# Patient Record
Sex: FEMALE | Race: WHITE
[De-identification: names, ages, dates, MRNs, and addresses within clinical notes are randomized per-mention and may not be internally consistent; named-entity substitution may affect disease eponyms.]

---

## 2017-01-24 ENCOUNTER — HOSPITAL ENCOUNTER (EMERGENCY)
Dept: HOSPITAL 25 - UCEAST | Age: 32
Discharge: HOME | End: 2017-01-24
Payer: COMMERCIAL

## 2017-01-24 VITALS — DIASTOLIC BLOOD PRESSURE: 91 MMHG | SYSTOLIC BLOOD PRESSURE: 139 MMHG

## 2017-01-24 DIAGNOSIS — Z32.02: ICD-10-CM

## 2017-01-24 DIAGNOSIS — F17.210: ICD-10-CM

## 2017-01-24 DIAGNOSIS — R00.2: Primary | ICD-10-CM

## 2017-01-24 DIAGNOSIS — R19.7: ICD-10-CM

## 2017-01-24 DIAGNOSIS — Z88.0: ICD-10-CM

## 2017-01-24 PROCEDURE — 84443 ASSAY THYROID STIM HORMONE: CPT

## 2017-01-24 PROCEDURE — G0463 HOSPITAL OUTPT CLINIC VISIT: HCPCS

## 2017-01-24 PROCEDURE — 81025 URINE PREGNANCY TEST: CPT

## 2017-01-24 PROCEDURE — 85025 COMPLETE CBC W/AUTO DIFF WBC: CPT

## 2017-01-24 PROCEDURE — 80048 BASIC METABOLIC PNL TOTAL CA: CPT

## 2017-01-24 PROCEDURE — 36415 COLL VENOUS BLD VENIPUNCTURE: CPT

## 2017-01-24 PROCEDURE — 93005 ELECTROCARDIOGRAM TRACING: CPT

## 2017-01-24 PROCEDURE — 99211 OFF/OP EST MAY X REQ PHY/QHP: CPT

## 2017-01-24 NOTE — UC
Palpitation/Dysrhythmia HP





- HPI Summary


HPI Summary: 





32 yo female has had intermittent skipped beats since 3AM


no CP or SOB





dad has AF





no f/c





three days ago had 15-20 episodes of diarrhea











- History of Current Complaint


Chief Complaint: UCChestPain


Stated Complaint: CHEST PAIN, AND PALIPATATIONS


Time Seen by Provider: 01/24/17 17:32


Hx Obtained From: Patient


Hx Last Menstrual Period: 3-4 weeks ago


Onset/Duration: Sudden Onset - about 3AM


Timing: Intermittent Episodes Lasting: - seconds


Severity Initially: Mild


Severity Currently: None


Pain Intensity: 1


Pain Scale Used: 0-10 Numeric


Character: Skipped Beats


Aggravating Factor(s): Nothing


Alleviating Factor(s): Nothing


Related History: Similar Episode/Dx as - has had them in past





- Allergy/Home Medications


Allergies/Adverse Reactions: 


 Allergies











Allergy/AdvReac Type Severity Reaction Status Date / Time


 


Penicillins [PCN] Allergy  Hives Verified 01/24/17 17:18











Home Medications: 


 Home Medications





NK [No Home Medications Reported]  01/24/17 [History Confirmed 01/24/17]











PMH/Surg Hx/FS Hx/Imm Hx


Previously Healthy: Yes





- Surgical History


Surgical History: None





- Family History


Known Family History: Positive: Hypertension, Diabetes, Other - a/f





- Social History


Alcohol Use: Occasionally


Substance Use Type: None


Smoking Status (MU): Current Every Day Smoker


Length of Time of Smoking/Using Tobacco: 4 cig a day





Review of Systems


Constitutional: Negative


Skin: Negative


Eyes: Negative


ENT: Negative


Respiratory: Negative


Cardiovascular: Palpitations


Gastrointestinal: Negative


Genitourinary: Negative


Motor: Negative


Neurovascular: Negative


Musculoskeletal: Negative


Neurological: Negative


Psychological: Negative


All Other Systems Reviewed And Are Negative: Yes





Physical Exam


Triage Information Reviewed: Yes


Appearance: Well-Appearing, No Pain Distress, Well-Nourished, Obese


Vital Signs: 


 Initial Vital Signs











Temp  97.8 F   01/24/17 17:06


 


Pulse  70   01/24/17 17:06


 


Resp  16   01/24/17 17:06


 


BP  139/91   01/24/17 17:06


 


Pulse Ox  99   01/24/17 17:06











Vital Signs Reviewed: Yes


Eyes: Positive: Conjunctiva Clear


ENT: Positive: Hearing grossly normal, Pharynx normal, Tonsillar swelling.  

Negative: Nasal congestion, Nasal drainage, Tonsillar exudate, Trismus, Muffled/

hoarse voice


Neck exam: Normal


Neck: Positive: Supple, Nontender, No Lymphadenopathy


Respiratory: Positive: Lungs clear, Normal breath sounds, No respiratory 

distress


Cardiovascular: Positive: RRR, No Murmur.  Negative: Tachycardia


Abdomen Description: Positive: Nontender, No Organomegaly, Soft


Musculoskeletal: Positive: ROM Intact, No Edema


Neurological Exam: Normal


Neurological: Positive: Alert


Psychological Exam: Normal





Diagnostics





- EKG


Cardiac Rate: NL


Cardiac Rhythm: Sinus: Normal


Ectopy: None


ST Segment: Normal





Palpitations Course/Dx





- Differential Dx/Diagnosis


Provider Diagnoses: palpitations





Discharge





- Discharge Plan


Condition: Stable


Disposition: HOME


Patient Education Materials:  Palpitations (ED)


Referrals: 


Vesta Cruz [Primary Care Provider] - 1 Week


Additional Instructions: 


blood work is pending





decrease or stop smoking





I suggest you ask your MD about getting a SLEEP STUDY





return for new or worsening symptoms





decrease or stop smoking


avoid caffeine

## 2017-01-25 LAB
ANION GAP SERPL CALC-SCNC: 6 MMOL/L (ref 2–11)
BUN SERPL-MCNC: 9 MG/DL (ref 6–24)
BUN/CREAT SERPL: 14.8 (ref 8–20)
CALCIUM SERPL-MCNC: 9.3 MG/DL (ref 8.6–10.3)
CHLORIDE SERPL-SCNC: 102 MMOL/L (ref 101–111)
GLUCOSE SERPL-MCNC: 76 MG/DL (ref 70–100)
HCO3 SERPL-SCNC: 28 MMOL/L (ref 22–32)
HCT VFR BLD AUTO: 42 % (ref 35–47)
HGB BLD-MCNC: 14.2 G/DL (ref 12–16)
MCH RBC QN AUTO: 29 PG (ref 27–31)
MCHC RBC AUTO-ENTMCNC: 34 G/DL (ref 31–36)
MCV RBC AUTO: 86 FL (ref 80–97)
POTASSIUM SERPL-SCNC: 3.7 MMOL/L (ref 3.5–5)
RBC # BLD AUTO: 4.91 10^6/UL (ref 4–5.4)
SODIUM SERPL-SCNC: 136 MMOL/L (ref 133–145)
TSH SERPL-ACNC: 3.23 MCIU/ML (ref 0.34–5.6)
WBC # BLD AUTO: 10.8 10^3/UL (ref 3.5–10.8)

## 2017-09-11 ENCOUNTER — HOSPITAL ENCOUNTER (EMERGENCY)
Dept: HOSPITAL 25 - UCEAST | Age: 32
Discharge: HOME | End: 2017-09-11
Payer: COMMERCIAL

## 2017-09-11 VITALS — SYSTOLIC BLOOD PRESSURE: 128 MMHG | DIASTOLIC BLOOD PRESSURE: 78 MMHG

## 2017-09-11 DIAGNOSIS — F17.210: ICD-10-CM

## 2017-09-11 DIAGNOSIS — J03.90: Primary | ICD-10-CM

## 2017-09-11 PROCEDURE — G0463 HOSPITAL OUTPT CLINIC VISIT: HCPCS

## 2017-09-11 PROCEDURE — 99212 OFFICE O/P EST SF 10 MIN: CPT

## 2017-09-11 NOTE — UC
Throat Pain/Nasal Oz HPI





- HPI Summary


HPI Summary: 





32 yo female with about a day hx of sore throat/fever/chills/HA and myalgias





works at a school





had a rash with amox as a child





- History of Current Complaint


Chief Complaint: UCGeneralIllness


Stated Complaint: SORE THROAT


Time Seen by Provider: 09/11/17 09:49


Hx Obtained From: Patient


Hx Last Menstrual Period: 9/14/17


Onset/Duration: Gradual Onset, Lasting Hours


Severity: Moderate


Pain Intensity: 4


Pain Scale Used: 0-10 Numeric


Cough: None


Associated Signs & Symptoms: Positive: Fever





- Allergies/Home Medications


Allergies/Adverse Reactions: 


 Allergies











Allergy/AdvReac Type Severity Reaction Status Date / Time


 


Penicillins [PCN] Allergy  Hives Verified 09/11/17 09:43











Home Medications: 


 Home Medications





LORazepam TAB(*) [Ativan 0.5 MG TAB (*)] 0.5 mg PO Q6HR 09/11/17 [History 

Confirmed 09/11/17]











PMH/Surg Hx/FS Hx/Imm Hx


Previously Healthy: Yes





- Surgical History


Surgical History: None





- Family History


Known Family History: Positive: Cardiac Disease, Hypertension, Diabetes, Other 

- a/f





- Social History


Alcohol Use: Occasionally


Substance Use Type: None


Smoking Status (MU): Current Every Day Smoker


Length of Time of Smoking/Using Tobacco: 4 cig a day





Review of Systems


Constitutional: Fever, Chills


Skin: Negative


Eyes: Negative


ENT: Sore Throat


Respiratory: Negative


Cardiovascular: Negative


Gastrointestinal: Negative


Genitourinary: Negative


Motor: Negative


Neurovascular: Negative


Musculoskeletal: Myalgia


Neurological: Headache


Psychological: Negative


Is Patient Immunocompromised?: No


All Other Systems Reviewed And Are Negative: Yes





Physical Exam


Triage Information Reviewed: Yes


Appearance: Well-Appearing, No Pain Distress, Well-Nourished


Vital Signs: 


 Initial Vital Signs











Temp  98.3 F   09/11/17 09:45


 


Pulse  70   09/11/17 09:45


 


Resp  16   09/11/17 09:45


 


BP  128/78   09/11/17 09:45


 


Pulse Ox  99   09/11/17 09:45











Vital Signs Reviewed: Yes


Eyes: Positive: Conjunctiva Clear


ENT: Positive: Hearing grossly normal, Pharyngeal erythema, TMs normal, 

Tonsillar swelling, Tonsillar exudate.  Negative: Nasal congestion, Nasal 

drainage, Trismus, Muffled/hoarse voice


Dental Exam: Normal


Neck: Positive: Supple, Enlarged Nodes @ - ant cervical


Respiratory: Positive: Lungs clear, Normal breath sounds, No respiratory 

distress


Cardiovascular: Positive: RRR, No Murmur


Musculoskeletal: Positive: ROM Intact, No Edema


Neurological: Positive: Alert, Muscle Tone Normal


Psychological Exam: Normal


Skin Exam: Normal





Throat Pain/Nasal Course/Dx





- Differential Dx/Diagnosis


Provider Diagnoses: acute exudative tonsillitis





Discharge





- Discharge Plan


Condition: Stable


Disposition: HOME


Prescriptions: 


Cephalexin CAP* [Keflex CAP*] 500 mg PO BID #20 cap


Fluconazole 150 MG (NF) [Diflucan 150 mg (NF)] 150 mg PO ONCE #1 tab


Patient Education Materials:  Tonsillitis (ED)


Forms:  *Work Release


Referrals: 


Vesta Cruz [Primary Care Provider] - 4 Days (if not better)


Additional Instructions: 


recheck in 3-4 days if not better





rest


fluids


tylenol

## 2017-11-25 ENCOUNTER — HOSPITAL ENCOUNTER (EMERGENCY)
Dept: HOSPITAL 25 - ED | Age: 32
Discharge: HOME | End: 2017-11-25
Payer: COMMERCIAL

## 2017-11-25 ENCOUNTER — HOSPITAL ENCOUNTER (EMERGENCY)
Dept: HOSPITAL 25 - UCEAST | Age: 32
Discharge: ANOTHER HOSPITAL | End: 2017-11-25
Payer: COMMERCIAL

## 2017-11-25 VITALS — DIASTOLIC BLOOD PRESSURE: 81 MMHG | SYSTOLIC BLOOD PRESSURE: 128 MMHG

## 2017-11-25 VITALS — DIASTOLIC BLOOD PRESSURE: 79 MMHG | SYSTOLIC BLOOD PRESSURE: 132 MMHG

## 2017-11-25 DIAGNOSIS — Z86.718: ICD-10-CM

## 2017-11-25 DIAGNOSIS — Z88.0: ICD-10-CM

## 2017-11-25 DIAGNOSIS — Z72.0: ICD-10-CM

## 2017-11-25 DIAGNOSIS — F17.210: ICD-10-CM

## 2017-11-25 DIAGNOSIS — Z86.711: ICD-10-CM

## 2017-11-25 DIAGNOSIS — R20.8: ICD-10-CM

## 2017-11-25 DIAGNOSIS — I83.93: Primary | ICD-10-CM

## 2017-11-25 DIAGNOSIS — M79.89: Primary | ICD-10-CM

## 2017-11-25 DIAGNOSIS — M79.661: ICD-10-CM

## 2017-11-25 DIAGNOSIS — M79.662: ICD-10-CM

## 2017-11-25 LAB
ALBUMIN SERPL BCG-MCNC: 4.1 G/DL (ref 3.2–5.2)
ALP SERPL-CCNC: 52 U/L (ref 34–104)
ALT SERPL W P-5'-P-CCNC: 23 U/L (ref 7–52)
ANION GAP SERPL CALC-SCNC: 6 MMOL/L (ref 2–11)
AST SERPL-CCNC: 19 U/L (ref 13–39)
BUN SERPL-MCNC: 10 MG/DL (ref 6–24)
BUN/CREAT SERPL: 15.4 (ref 8–20)
CALCIUM SERPL-MCNC: 9 MG/DL (ref 8.6–10.3)
CHLORIDE SERPL-SCNC: 105 MMOL/L (ref 101–111)
GLOBULIN SER CALC-MCNC: 3 G/DL (ref 2–4)
GLUCOSE SERPL-MCNC: 70 MG/DL (ref 70–100)
HCO3 SERPL-SCNC: 28 MMOL/L (ref 22–32)
HCT VFR BLD AUTO: 41 % (ref 35–47)
HGB BLD-MCNC: 13.9 G/DL (ref 12–16)
MCH RBC QN AUTO: 30 PG (ref 27–31)
MCHC RBC AUTO-ENTMCNC: 34 G/DL (ref 31–36)
MCV RBC AUTO: 86 FL (ref 80–97)
POTASSIUM SERPL-SCNC: 3.3 MMOL/L (ref 3.5–5)
PROT SERPL-MCNC: 7.1 G/DL (ref 6.4–8.9)
RBC # BLD AUTO: 4.72 10^6/UL (ref 4–5.4)
SODIUM SERPL-SCNC: 139 MMOL/L (ref 133–145)
WBC # BLD AUTO: 10 10^3/UL (ref 3.5–10.8)

## 2017-11-25 PROCEDURE — 99211 OFF/OP EST MAY X REQ PHY/QHP: CPT

## 2017-11-25 PROCEDURE — 80053 COMPREHEN METABOLIC PANEL: CPT

## 2017-11-25 PROCEDURE — G0463 HOSPITAL OUTPT CLINIC VISIT: HCPCS

## 2017-11-25 PROCEDURE — 85730 THROMBOPLASTIN TIME PARTIAL: CPT

## 2017-11-25 PROCEDURE — 36415 COLL VENOUS BLD VENIPUNCTURE: CPT

## 2017-11-25 PROCEDURE — 85027 COMPLETE CBC AUTOMATED: CPT

## 2017-11-25 PROCEDURE — 85610 PROTHROMBIN TIME: CPT

## 2017-11-25 PROCEDURE — 86141 C-REACTIVE PROTEIN HS: CPT

## 2017-11-25 PROCEDURE — 99282 EMERGENCY DEPT VISIT SF MDM: CPT

## 2017-11-25 NOTE — RAD
INDICATION:  Right lower extremity pain and swelling.



COMPARISON:  There are no prior studies available for comparison.



TECHNIQUE:  Multiple real-time, color flow and Doppler tracings of the right lower

extremity were obtained.



FINDINGS: The common femoral, femoral, profunda femoral and popliteal veins all

demonstrate normal compressibility, augmentation with compression and phasic response with

respiration.



The posterior tibial and peroneal veins demonstrate normal compressibility and

augmentation with compression.



IMPRESSION:  NO EVIDENCE FOR DEEP VENOUS THROMBOSIS.

## 2017-11-25 NOTE — UC
Lower Extremity/Ankle HPI





- HPI Summary


HPI Summary: 


unusual pain and swelling sensation in right lower calf, bruising with hematoma 

right thigh








- History of Current Complaint


Chief Complaint: UCLowerExtremity


Stated Complaint: SPOTS ON LEGS


Time Seen by Provider: 11/25/17 14:03


Hx Obtained From: Patient


Hx From Patient Unobtainable Due To: Dementia


Hx Last Menstrual Period: 11/17/17


Pregnant?: No


Onset/Duration: Sudden Onset, Lasting Days - 2, Still Present


Severity Initially: Mild


Severity Currently: Mild


Aggravating Factor(s): Nothing


Alleviating Factor(s): Nothing


Able to Bear Weight: Yes





- Allergies/Home Medications


Allergies/Adverse Reactions: 


 Allergies











Allergy/AdvReac Type Severity Reaction Status Date / Time


 


Fluoxetine [From Prozac] Allergy  See Comment Verified 11/25/17 14:40


 


Penicillins [PCN] Allergy  Hives Verified 11/25/17 14:40














PMH/Surg Hx/FS Hx/Imm Hx


Previously Healthy: No - dlood clots in right leg times 2





- Surgical History


Surgical History: None





- Family History


Known Family History: Positive: Cardiac Disease, Hypertension, Diabetes, Other 

- a/f





- Social History


Occupation: Employed Full-time


Lives: Alone


Alcohol Use: Occasionally


Substance Use Type: None


Smoking Status (MU): Current Every Day Smoker


Length of Time of Smoking/Using Tobacco: 4 cig a day


Cessation Counseling: Patient Advised to Stop





Review of Systems


Constitutional: Negative


Skin: Negative


Eyes: Negative


ENT: Negative


Respiratory: Negative


Cardiovascular: Negative


Gastrointestinal: Negative


Genitourinary: Negative


Motor: Negative


Neurovascular: Negative


Musculoskeletal: Myalgia - right thight and calf


Neurological: Negative


Psychological: Negative


Is Patient Immunocompromised?: No


All Other Systems Reviewed And Are Negative: Yes





Physical Exam


Triage Information Reviewed: Yes


Appearance: Well-Appearing, No Pain Distress, Well-Nourished


Vital Signs: 


 Initial Vital Signs











Temp  97.8 F   11/25/17 11:45


 


Pulse  90   11/25/17 11:45


 


Resp  18   11/25/17 11:45


 


BP  131/66   11/25/17 11:45


 


Pulse Ox  99   11/25/17 11:45











Vital Signs Reviewed: Yes


Eye Exam: Normal


Eyes: Positive: Conjunctiva Clear


ENT Exam: Normal


ENT: Positive: Normal ENT inspection, Hearing grossly normal, Pharynx normal.  

Negative: Nasal congestion, Nasal drainage, Tonsillar swelling, Tonsillar 

exudate, Trismus, Muffled voice, Hoarse voice


Dental Exam: Normal


Neck exam: Normal


Neck: Positive: Supple, Nontender


Respiratory Exam: Normal


Respiratory: Positive: Chest non-tender, Lungs clear, Normal breath sounds, No 

respiratory distress, No accessory muscle use


Cardiovascular Exam: Normal


Cardiovascular: Positive: RRR, No Murmur, Pulses Normal, Brisk Capillary Refill


Musculoskeletal Exam: Normal


Musculoskeletal: Positive: Strength Intact, ROM Intact


Neurological Exam: Normal


Psychological Exam: Normal


Skin Exam: Normal





Lower Extremity Course/Dx





- Course


Course Of Treatment: [place] [facility] [place]





- Differential Dx/Diagnosis


Provider Diagnoses: Swelling 4 leg





Discharge





- Discharge Plan


Condition: Stable


Disposition: OTHER


Discharge Disposition Comment: [facility]


Patient Education Materials:  Leg Edema (ED)


Referrals: 


Abby Flowers MD [Primary Care Provider] - 


Additional Instructions: 


Please report to emergency department for further evaluation on swelling and 

tightness in right lower leg

## 2017-11-25 NOTE — ED
Bora MOHAMUD Gabriel, scribed for Noel Reyes MD on 11/25/17 at 1738 .





Lower Extremity





- HPI Summary


HPI Summary: 





This patient is a 32 year old F presenting to [facility] with a chief complaint of 

pain in LLE since 3 days prior. She was sent to the ED after being referred 

from urgent care for possible DVTs due to her history of them. The patient 

rates the pain 3/10 in severity. Patient reports unexplained ecchymosis in legs

, numbness and tingling in her right hand, and pain in her right calf. Patient 

denies CP.





- History of Current Complaint


Chief Complaint: EDGeneral


Stated Complaint: LEG AND ARM PAIN SENT FROM [facility]


Time Seen by Provider: 11/25/17 17:10


Hx Obtained From: Patient


Hx Last Menstrual Period: 11/17/17


Onset/Duration: Still Present - 3


Pain Intensity: 3


Pain Scale Used: 0-10 Numeric


Timing: Constant


Able to Bear Weight: Yes





- Allergies/Home Medications


Allergies/Adverse Reactions: 


 Allergies











Allergy/AdvReac Type Severity Reaction Status Date / Time


 


Fluoxetine [From Prozac] Allergy  See Comment Verified 11/25/17 14:40


 


Penicillins [PCN] Allergy  Hives Verified 11/25/17 14:40














PMH/Surg Hx/FS Hx/Imm Hx


Previously Healthy: No


Endocrine/Hematology History: 


   Denies: Hx Diabetes, Hx Thyroid Disease


Cardiovascular History: Reports: Hx Deep Vein Thrombosis - in 2016 


   Denies: Hx Hypertension


Respiratory History: 


   Denies: Hx Asthma, Hx Chronic Obstructive Pulmonary Disease (COPD)


GI History: 


   Denies: Hx Ulcer





- Immunization History


Date of Tetanus Vaccine: UTD


Date of Influenza Vaccine: NO


Infectious Disease History: No


Infectious Disease History: 


   Denies: Hx Clostridium Difficile, Hx Hepatitis, Hx Human Immunodeficiency 

Virus (HIV), Hx of Known/Suspected MRSA, Hx Shingles, Hx Tuberculosis, Hx Known/

Suspected VRE, Hx Known/Suspected VRSA, History Other Infectious Disease, 

Traveled Outside the [place] in Last 30 Days





- Family History


Known Family History: Positive: Cardiac Disease, Hypertension, Diabetes, Other 

- a/f





- Social History


Alcohol Use: Occasionally


Substance Use Type: Reports: None


Smoking Status (MU): Light Every Day Tobacco Smoker


Length of Time of Smoking/Using Tobacco: 4 cig a day





Review of Systems


Negative: Fever, Chills


Negative: Erythema


Negative: Sore Throat


Negative: Chest Pain


Negative: Shortness Of Breath, Cough


Negative: Abdominal Pain, Vomiting, Nausea


Negative: dysuria, hematuria


Positive: Other - LE pain and numbess and tingling in her right hand .  Negative

: Myalgia


Positive: Bruising - on both LE .  Negative: Rash


Neurological: Negative - dizziness


All Other Systems Reviewed And Are Negative: Yes





Physical Exam





- Summary


Physical Exam Summary: 





Constitutional: Well-developed, Well-nourished, Alert. (-) Distressed


Skin: Warm, Dry. There is induration 1cm below the right groin crease with 

overlaying ecchymosis. 


HENT: Normocephalic; Atraumatic 


Eyes: Conjunctiva normal


Neck: Musculoskeletal ROM normal neck. (-) JVD, (-) Stridor, (-) Tracheal 

deviation


Cardio: Rhythm regular, rate normal, Heart sounds normal; Intact distal pulses; 

The pedal pulses are 2+ and symmetric. Radial pulses are 2+ and symmetric. (-) 

Murmur


Pulmonary/Chest wall: Effort normal. (-) Respiratory distress, (-) Wheezes, (-) 

Rales


Abd: Soft, (-) Tenderness, (-) Distension, (-) Guarding, (-) Rebound


Musculoskeletal: (-) Edema. There is tenderness in the patients right calf. 


Lymph: (-) Cervical adenopathy


Neuro: Alert, Oriented x3


Psych: Mood and affect Normal


Triage Information Reviewed: Yes


Vital Signs On Initial Exam: 


 Initial Vitals











Temp Pulse Resp BP Pulse Ox


 


 97.7 F   66   16   141/81   96 


 


 11/25/17 14:40  11/25/17 14:40  11/25/17 14:40  11/25/17 14:40  11/25/17 14:40











Vital Signs Reviewed: Yes





- [place] Coma Scale


Coma Scale Total: 15





Diagnostics





- Vital Signs


 Vital Signs











  Temp Pulse Resp BP Pulse Ox


 


 11/25/17 14:40  97.7 F  66  16  141/81  96














- Laboratory


Result Diagrams: 


 11/25/17 17:55





 11/25/17 17:55


Lab Statement: Any lab studies that have been ordered have been reviewed, and 

results considered in the medical decision making process.





- Additional Comments


Diagnostic Additional Comments: 





Venous Doppler study reveals, per radiologist, NO EVIDENCE FOR DEEP VENOUS 

THROMBOSIS.


ED physician has reviewed this radiology report and agrees.





Re-Evaluation





- Re-Evaluation


  ** First Eval


Re-Evaluation Time: 18:34


Change: Unchanged - Discussed the possibility of error with patient and told 

her to come back for worsening symptoms.





Lower Extremity Course/Dx





- Diagnoses


Provider Diagnoses: 


 Varicose veins of bilateral lower extremities with pain, Calf pain








Discharge





- Discharge Plan


Condition: Stable


Disposition: HOME


Patient Education Materials:  Varicose Veins (ED)


Referrals: 


Abby Flowers MD [Primary Care Provider] - 3 Days


Additional Instructions: 


Return to the emergency department for new or worsening symptoms. 





The documentation as recorded by the Bora valdez Gabriel accurately reflects 

the service I personally performed and the decisions made by Amy reyes Jerry, MD.

## 2019-03-21 ENCOUNTER — HOSPITAL ENCOUNTER (OUTPATIENT)
Dept: HOSPITAL 25 - ED | Age: 34
Setting detail: OBSERVATION
LOS: 2 days | Discharge: HOME | End: 2019-03-23
Attending: INTERNAL MEDICINE | Admitting: INTERNAL MEDICINE
Payer: COMMERCIAL

## 2019-03-21 DIAGNOSIS — M25.50: ICD-10-CM

## 2019-03-21 DIAGNOSIS — F32.9: ICD-10-CM

## 2019-03-21 DIAGNOSIS — R06.00: ICD-10-CM

## 2019-03-21 DIAGNOSIS — I82.4Z1: ICD-10-CM

## 2019-03-21 DIAGNOSIS — F17.210: ICD-10-CM

## 2019-03-21 DIAGNOSIS — Z88.0: ICD-10-CM

## 2019-03-21 DIAGNOSIS — N76.0: ICD-10-CM

## 2019-03-21 DIAGNOSIS — R21: Primary | ICD-10-CM

## 2019-03-21 DIAGNOSIS — R59.1: ICD-10-CM

## 2019-03-21 DIAGNOSIS — R00.0: ICD-10-CM

## 2019-03-21 DIAGNOSIS — R06.02: ICD-10-CM

## 2019-03-21 LAB
ALBUMIN SERPL BCG-MCNC: 3.4 G/DL (ref 3.2–5.2)
ALBUMIN/GLOB SERPL: 1.1 {RATIO} (ref 1–3)
ALP SERPL-CCNC: 54 U/L (ref 34–104)
ALT SERPL W P-5'-P-CCNC: 12 U/L (ref 7–52)
ANION GAP SERPL CALC-SCNC: 9 MMOL/L (ref 2–11)
AST SERPL-CCNC: 14 U/L (ref 13–39)
BASOPHILS # BLD AUTO: 0 10^3/UL (ref 0–0.2)
BUN SERPL-MCNC: 12 MG/DL (ref 6–24)
BUN/CREAT SERPL: 15.6 (ref 8–20)
CALCIUM SERPL-MCNC: 9 MG/DL (ref 8.6–10.3)
CHLORIDE SERPL-SCNC: 104 MMOL/L (ref 101–111)
CK SERPL-CCNC: 37 U/L (ref 10–223)
EOSINOPHIL # BLD AUTO: 0.5 10^3/UL (ref 0–0.6)
FLUAV RNA SPEC QL NAA+PROBE: NEGATIVE
FLUBV RNA SPEC QL NAA+PROBE: NEGATIVE
GLOBULIN SER CALC-MCNC: 3.1 G/DL (ref 2–4)
GLUCOSE SERPL-MCNC: 100 MG/DL (ref 70–100)
HCG SERPL QL: < 0.6 MIU/ML
HCO3 SERPL-SCNC: 25 MMOL/L (ref 22–32)
HCT VFR BLD AUTO: 46 % (ref 33–41)
HGB BLD-MCNC: 15.7 G/DL (ref 12–16)
INR PPP/BLD: 1.29 (ref 0.77–1.02)
LYMPHOCYTES # BLD AUTO: 1.4 10^3/UL (ref 1–4.8)
MCH RBC QN AUTO: 29 PG (ref 27–31)
MCHC RBC AUTO-ENTMCNC: 34 G/DL (ref 31–36)
MCV RBC AUTO: 85 FL (ref 80–97)
MONOCYTES # BLD AUTO: 0.4 10^3/UL (ref 0–0.8)
NEUTROPHILS # BLD AUTO: 11.8 10^3/UL (ref 1.5–7.7)
NRBC # BLD AUTO: 0 10^3/UL
NRBC BLD QL AUTO: 0
PLATELET # BLD AUTO: 221 10^3/UL (ref 150–450)
POTASSIUM SERPL-SCNC: 3.8 MMOL/L (ref 3.5–5)
PROT SERPL-MCNC: 6.5 G/DL (ref 6.4–8.9)
RBC # BLD AUTO: 5.41 10^6 /UL (ref 3.7–4.87)
RBC UR QL AUTO: (no result)
SODIUM SERPL-SCNC: 138 MMOL/L (ref 135–145)
TSH SERPL-ACNC: 2.05 MCIU/ML (ref 0.34–5.6)
WBC # BLD AUTO: 14.1 10^3/UL (ref 3.5–10.8)
WBC UR QL AUTO: (no result)

## 2019-03-21 PROCEDURE — 82550 ASSAY OF CK (CPK): CPT

## 2019-03-21 PROCEDURE — 87086 URINE CULTURE/COLONY COUNT: CPT

## 2019-03-21 PROCEDURE — G0378 HOSPITAL OBSERVATION PER HR: HCPCS

## 2019-03-21 PROCEDURE — 83605 ASSAY OF LACTIC ACID: CPT

## 2019-03-21 PROCEDURE — 99285 EMERGENCY DEPT VISIT HI MDM: CPT

## 2019-03-21 PROCEDURE — 71046 X-RAY EXAM CHEST 2 VIEWS: CPT

## 2019-03-21 PROCEDURE — 86147 CARDIOLIPIN ANTIBODY EA IG: CPT

## 2019-03-21 PROCEDURE — 82595 ASSAY OF CRYOGLOBULIN: CPT

## 2019-03-21 PROCEDURE — 80053 COMPREHEN METABOLIC PANEL: CPT

## 2019-03-21 PROCEDURE — 87040 BLOOD CULTURE FOR BACTERIA: CPT

## 2019-03-21 PROCEDURE — 84702 CHORIONIC GONADOTROPIN TEST: CPT

## 2019-03-21 PROCEDURE — 82784 ASSAY IGA/IGD/IGG/IGM EACH: CPT

## 2019-03-21 PROCEDURE — 96365 THER/PROPH/DIAG IV INF INIT: CPT

## 2019-03-21 PROCEDURE — 86644 CMV ANTIBODY: CPT

## 2019-03-21 PROCEDURE — 85025 COMPLETE CBC W/AUTO DIFF WBC: CPT

## 2019-03-21 PROCEDURE — 87651 STREP A DNA AMP PROBE: CPT

## 2019-03-21 PROCEDURE — 86308 HETEROPHILE ANTIBODY SCREEN: CPT

## 2019-03-21 PROCEDURE — 80048 BASIC METABOLIC PNL TOTAL CA: CPT

## 2019-03-21 PROCEDURE — C8929 TTE W OR WO FOL WCON,DOPPLER: HCPCS

## 2019-03-21 PROCEDURE — 85610 PROTHROMBIN TIME: CPT

## 2019-03-21 PROCEDURE — 87510 GARDNER VAG DNA DIR PROBE: CPT

## 2019-03-21 PROCEDURE — 87491 CHLMYD TRACH DNA AMP PROBE: CPT

## 2019-03-21 PROCEDURE — 84443 ASSAY THYROID STIM HORMONE: CPT

## 2019-03-21 PROCEDURE — 86665 EPSTEIN-BARR CAPSID VCA: CPT

## 2019-03-21 PROCEDURE — 80307 DRUG TEST PRSMV CHEM ANLYZR: CPT

## 2019-03-21 PROCEDURE — 87480 CANDIDA DNA DIR PROBE: CPT

## 2019-03-21 PROCEDURE — 36415 COLL VENOUS BLD VENIPUNCTURE: CPT

## 2019-03-21 PROCEDURE — 87591 N.GONORRHOEAE DNA AMP PROB: CPT

## 2019-03-21 PROCEDURE — 96361 HYDRATE IV INFUSION ADD-ON: CPT

## 2019-03-21 PROCEDURE — 85384 FIBRINOGEN ACTIVITY: CPT

## 2019-03-21 PROCEDURE — 86664 EPSTEIN-BARR NUCLEAR ANTIGEN: CPT

## 2019-03-21 PROCEDURE — 81015 MICROSCOPIC EXAM OF URINE: CPT

## 2019-03-21 PROCEDURE — 86645 CMV ANTIBODY IGM: CPT

## 2019-03-21 PROCEDURE — 71275 CT ANGIOGRAPHY CHEST: CPT

## 2019-03-21 PROCEDURE — 85652 RBC SED RATE AUTOMATED: CPT

## 2019-03-21 PROCEDURE — 82585 ASSAY OF CRYOFIBRINOGEN: CPT

## 2019-03-21 PROCEDURE — 85379 FIBRIN DEGRADATION QUANT: CPT

## 2019-03-21 PROCEDURE — 93306 TTE W/DOPPLER COMPLETE: CPT

## 2019-03-21 PROCEDURE — 86160 COMPLEMENT ANTIGEN: CPT

## 2019-03-21 PROCEDURE — 86747 PARVOVIRUS ANTIBODY: CPT

## 2019-03-21 PROCEDURE — 83516 IMMUNOASSAY NONANTIBODY: CPT

## 2019-03-21 PROCEDURE — 86592 SYPHILIS TEST NON-TREP QUAL: CPT

## 2019-03-21 PROCEDURE — 96366 THER/PROPH/DIAG IV INF ADDON: CPT

## 2019-03-21 PROCEDURE — 86140 C-REACTIVE PROTEIN: CPT

## 2019-03-21 PROCEDURE — 93005 ELECTROCARDIOGRAM TRACING: CPT

## 2019-03-21 PROCEDURE — 86038 ANTINUCLEAR ANTIBODIES: CPT

## 2019-03-21 PROCEDURE — 81003 URINALYSIS AUTO W/O SCOPE: CPT

## 2019-03-21 RX ADMIN — SODIUM CHLORIDE ONE MLS/HR: 900 IRRIGANT IRRIGATION at 15:07

## 2019-03-21 RX ADMIN — SODIUM CHLORIDE ONE MLS/HR: 900 IRRIGANT IRRIGATION at 16:24

## 2019-03-21 RX ADMIN — LURASIDONE HYDROCHLORIDE SCH MG: 60 TABLET, FILM COATED ORAL at 21:23

## 2019-03-21 RX ADMIN — SODIUM CHLORIDE SCH MLS/HR: 900 IRRIGANT IRRIGATION at 19:50

## 2019-03-21 NOTE — ED
Shortness of Breath





- HPI Summary


HPI Summary: 





A 32 y/o female presents to [facility] with a chief complaint of SOB starting 12 

hours PTA. She claims that she has had a rash for three days on her arms and 

across her abdomen. She saw Dr. Castle, her PCP at [facility], who thinks that her rash is due to an autoimmune 

disease. She claims that her pulse PTA was 147. She reports that exertion 

aggravates her pain and rest alleviates her pain. At triage she rated her pain 

as a 4/10 in severity. She c/o joint pain and sharp/shooting pain in her arms 

and legs. She believes that she has not eaten anything new and has not used any 

new soaps or body washes. Vital signs while in room - HR: 111bpm, O2 Sat: 97, BP

: 119/78.





- History of Current Complaint


Chief Complaint: EDShortnessOfBreath


Time Seen by Provider: 03/21/19 09:20


Hx Obtained From: Patient


Onset/Duration: Sudden Onset, Lasting Hours, Still Present


Timing: Constant


Current Severity: Moderate


Dyspnea At: Exertion


Aggrevating Factors: Movement


Alleviating Factors: Other - rest


Associated Signs & Symptoms: Negative - fever





- Allergy/Home Medications


Allergies/Adverse Reactions: 


 Allergies











Allergy/AdvReac Type Severity Reaction Status Date / Time


 


fluoxetine Allergy  Unknown Verified 03/21/19 09:25





   Reaction  





   Details  


 


Penicillins Allergy  Hives Verified 03/21/19 09:25











Home Medications: 


 Home Medications





Lurasidone(*) [Latuda] 60 mg PO DAILY 03/21/19 [History Confirmed 03/21/19]


traZODone TAB* [Desyrel TAB*] 50 mg PO BEDTIME 03/21/19 [History Confirmed 03/21 /19]











PMH/Surg Hx/FS Hx/Imm Hx


Endocrine/Hematology History: 


   Denies: Hx Diabetes, Hx Thyroid Disease


Cardiovascular History: Reports: Hx Deep Vein Thrombosis - in 2016 


   Denies: Hx Hypertension


Respiratory History: 


   Denies: Hx Asthma, Hx Chronic Obstructive Pulmonary Disease (COPD)


GI History: 


   Denies: Hx Ulcer





- Immunization History


Date of Tetanus Vaccine: UTD


Date of Influenza Vaccine: NO


Infectious Disease History: No


Infectious Disease History: 


   Denies: Hx Clostridium Difficile, Hx Hepatitis, Hx Human Immunodeficiency 

Virus (HIV), Hx of Known/Suspected MRSA, Hx Shingles, Hx Tuberculosis, Hx Known/

Suspected VRE, Hx Known/Suspected VRSA, History Other Infectious Disease, 

Traveled Outside the [place] in Last 30 Days





- Family History


Known Family History: Positive: Cardiac Disease, Hypertension, Diabetes, Other 

- a/f





- Social History


Alcohol Use: Occasionally


Substance Use Type: Reports: None


Smoking Status (MU): Light Every Day Tobacco Smoker


Length of Time of Smoking/Using Tobacco: 4 cig a day





Review of Systems


Negative: Fever


Positive: Other - positive: reports being tachycardic at 147bpm PTA


Positive: Shortness Of Breath


Positive: Arthralgia, Myalgia


Positive: Rash


All Other Systems Reviewed And Are Negative: Yes





Physical Exam





- Summary


Physical Exam Summary: 





Appearance: The patient is well-nourished in no acute distress and in no acute 

pain.


 


Skin: Diffuse lacy rash, mildly erythematous and blanchous.





HEENT: The head is normocephalic and atraumatic. The pupils are equal and 

reactive. The conjunctivae are clear and without drainage. Nares are patent and 

without drainage. Mouth reveals moist mucous membranes and the throat is 

without erythema and exudate. The external ears are intact. The ear canals are 

patent and without drainage. The tympanic membranes are intact.


 


Neck: The neck is supple with full range of motion and non-tender. There are no 

carotid bruits. There is no neck vein distension.


 


Respiratory: Chest is non-tender. Lungs are clear to auscultation and breath 

sounds are symmetrical and equal.


 


Cardiovascular: Heart is regular rate and rhythm. There is no murmur or rub 

auscultated. There is no peripheral edema and pulses are symmetrical and equal.


 


Abdomen: The abdomen is soft and non-tender. There are normal bowel sounds 

heard in all four quadrants and there is no organomegaly palpated.


 


Musculoskeletal: There is no back tenderness noted. Extremities are non-tender 

with full range of motion. There is good capillary refill. There is no 

peripheral edema or calf tenderness elicited.


 


Neurological: Patient is alert and oriented to person, place and time. The 

patient has symmetrical motor strength in all four extremities. Cranial nerves 

are grossly intact. Deep tendon reflexes are symmetrical and equal in all four 

extremities.


 


Psychiatric: The patient has an appropriate affect and does not exhibit any 

anxiety or depression.


Triage Information Reviewed: Yes


Vital Signs On Initial Exam: 


 Initial Vitals











Temp Pulse Resp BP Pulse Ox


 


 98.4 F   140   22   112/88   98 


 


 03/21/19 08:58  03/21/19 08:58  03/21/19 08:58  03/21/19 08:58  03/21/19 08:58











Vital Signs Reviewed: Yes





Diagnostics





- Vital Signs


 Vital Signs











  Temp Pulse Resp BP Pulse Ox


 


 03/21/19 09:19   143  25  


 


 03/21/19 09:10    22  119/78 


 


 03/21/19 09:07   141    99


 


 03/21/19 08:58  98.4 F  140  22  112/88  98














- Laboratory


Result Diagrams: 


 03/22/19 06:27





 03/22/19 06:27


Lab Statement: Any lab studies that have been ordered have been reviewed, and 

results considered in the medical decision making process.





- Radiology


  ** CXR


Radiology Interpretation Completed By: Radiologist


Summary of Radiographic Findings: NO ACTIVE CARDIOPULMONARY DISEASE.  ED 

physician has reviewed this imaging report.





- CT


  ** chest/thorax CTA


CT Interpretation Completed By: Radiologist


Summary of CT Findings: NO PULMONARY ARTERIAL FILLING DEFECT TO SUGGEST 

PULMONARY EMBOLISM.  RIGHT AXILLARY LYMPHADENOPATHY.  CHOLELITHIASIS.  ED 

physician has reviewed this imaging report.





Course/Dx





- Course


Course Of Treatment: Ms. Renetta Rausch presented with a couple of days of 

exertional shortness of breath.  She has a history of a superficial 

thrombophlebitis in the past was not being treated for that.  She also notes 

that she developed a diffuse rash that started on her forearms and quickly 

spread over most of her body with her face being spared.  It is not itchy nor 

does it hurt.  She went to see her PCP and was noted to be tachycardic in the 

140s.  She was nontoxic in appearance here with stable vital signs and was not 

tachycardic lying in the gurney.  She had diffuse lacy, blanching mildly 

erythematous rash.  Labs were obtained and she had a fairly slightly elevated 

white blood cell count of 14 and an elevated CRP in the 100 range.  When we 

made an attempt to ambulate her she did get very tachycardic.  She was given IV 

fluids.  She did not meet septic criteria here at rest in the bed.  She was not 

febrile, she had a very mild leukocytosis and she was not tachycardic.  However 

any attempt to get her up and she got tachycardic in spite of 2 L of fluid in 

the hospitalist service was contacted for evaluation and admission.





- Diagnoses


Provider Diagnoses: 


 Tachycardia, Shortness of breath, Dyspnea








- Physician Notifications


Discussed Care of Patient With: Kam Castillo


Time Discussed With Above Provider: 17:56


Instructed by Provider To: Admit As Inpatient





Discharge





- Sign-Out/Discharge


Documenting (check all that apply): Patient Departure - admit


Patient Received Moderate/Deep Sedation with Procedure: No





- Discharge Plan


Condition: Fair


Disposition: ADMITTED TO [facility]





- Billing Disposition and Condition


Condition: FAIR


Disposition: Admitted to [facility]





- Attestation Statements


Document Initiated by Scribe: Yes


Documenting Scribe: Jorge Campbell


Provider For Whom Scribe is Documenting (Include Credential): Jerome Chaudhary MD


Scribe Attestation: 


IJorge scribed for Jerome Chaudhary MD on 03/22/19 at 1703. 


Scribe Documentation Reviewed: Yes


Provider Attestation: 


The documentation as recorded by the Jorge valdez accurately 

reflects the service I personally performed and the decisions made by me, 

Jerome Chaudhary MD


Status of Scribe Document: Viewed

## 2019-03-21 NOTE — HP
CC:  Dr. Elijah Castle *

 

HISTORY AND PHYSICAL:

 

DATE OF ADMISSION:  19

 

PRIMARY CARE PROVIDER:  Dr. Elijah Castle.

 

ATTENDING PHYSICIAN:  Dr. Kam Castillo * (dictated by Isabelle Leos NP).

 

CHIEF COMPLAINT:  Shortness of breath and rash.

 

HISTORY OF PRESENT ILLNESS:  Ms. Renetta Rausch is a 33-year-old female with 
past medical history of bipolar depression and a superficial blood clot, who 
presents to the emergency room today with complaints of shortness of breath and 
a rash.  The patient reports that her symptoms came on somewhat abruptly 
approximately 3 days ago.  She started having shortness of breath with 
exertion.  This was relieved with rest, though she also is noted to have 
shortness of breath simply while standing and while having a conversation.  She 
additionally noticed a rash from her knees to her neck.  There is no pain or 
itching associated with the rash.  She does complain of generalized myalgias 
and arthralgias.  She feels as though her face is also swollen.  She was 
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  
At that point, she was noted to have a resting heart rate in the 140s.  She 
denies any fevers, changes in appetite, chest pain, cough, nausea, vomiting, 
diarrhea, abdominal pain, dysuria, neuro deficits, changes in vision, 
dysphagia.  The patient does report since arriving in the emergency room this 
morning, she has developed some pain in her left ear which is intermittent, 
lasting only a few seconds at a time, though this pain is very sharp.  She also 
reports some vaginal pain.  She noticed this while wiping when providing a 
urine sample. Again, she does not have any dysuria, but rather just pain on 
palpation of the labia.  She denies any recent changes in diet, soap, or 
detergent.  In the emergency room, the patient was noted to be tachycardic up 
into the 140s.  She was noted to have leukocytosis and elevated D-dimer and an 
elevated CRP.  She had a normal chest x-ray and a chest CTA, which was 
unremarkable for a pulmonary embolism, though it did show some right axillary 
lymphadenopathy.  In the emergency room, they gave the patient fluids in an 
attempt to decrease her heart rate, though ultimately that was not successful 
and so she was referred to the hospitalist service to evaluate for admission. 



PAST MEDICAL HISTORY:

1.  Bipolar depression.

2.  Superficial blood clot in the right lower extremity (the patient reports 
that this was not a DVT).

 

PAST SURGICAL HISTORY:  None.

 

HOME MEDICATIONS:

1.  Latuda 60 mg p.o. daily.

2.  Trazodone 50 mg p.o. at bedtime.

3.  Multivitamin 1 tablet p.o. daily.

 

FAMILY HISTORY:  Her father has atrial fibrillation and has some cardiac 
abnormality affecting the anatomy and position of his heart, though she is not 
sure what.  She has a grandma with polymyalgia rheumatica.  Her grandfather had 
heart disease requiring bypass, though ultimately  from colon cancer.  Her 
mother is alive and well.  She reports 3 out of 4 grandparents have diabetes.

 

SOCIAL HISTORY:  The patient reports smoking approximately 4 to 5 cigarettes 
per day.  She consumed alcohol socially.  She denies any recreational drug use.
  She works as a  at a doctor's office.  She lives at home with her 
fiance and 2 children.  The patient's mother, Evi, will be her surrogate 
decision maker in the event she is unable to make her own decisions.  Her phone 
number is [phone].

 

REVIEW OF SYSTEMS:  An 11-point review of systems was performed and all the 
pertinent positive and negative findings are in the HPI.  All other systems are 
negative.

 

                               PHYSICAL EXAMINATION

 

GENERAL:  Ms. Renetta Rausch is a well-developed, well-nourished, obese white 
female, sitting up in bed, in no acute distress.  She appears her stated age.

 

VITAL SIGNS:  Temp 98.4, heart rate 123, respiratory rate 15, oxygen saturation 
99% on room air, and blood pressure 112/79.

 

HEENT:  Head is atraumatic, normocephalic.  Visual fields are grossly intact. 
Pupils equal, round, and reactive to light and accommodation.  Extraocular 
movements intact.  Hearing is grossly intact.  External auditory canal is 
patent and free of cerumen.  Tympanic membranes intact with visible landmarks.  
Oral mucous membranes are moist and without lesions.  Tonsils without erythema 
or exudates.  Pharynx is clear.

 

NECK:  Full range of motion.  Thyroid not palpable.  Trachea midline.  No 
lymphadenopathy.

 

RESPIRATORY:  Symmetrical chest expansion.  No chest wall deformities.  Lungs 
clear to auscultation throughout.  No rhonchi, wheezes, or rubs.

 

CARDIOVASCULAR:  Regular rate, tachycardic.  S1, S2 present.  No murmurs, rubs, 
or gallops.  No JVD.

 

ABDOMEN:  Soft, nontender to palpation.  Bowel sounds normoactive throughout.

 

EXTREMITIES:  Skin warm and smooth bilaterally.  No edema.  No clubbing or 
cyanosis.  Pedal pulses 2+ bilaterally.

 

MUSCULOSKELETAL:  Full range of motion.  Generalized myalgias and arthralgias.

 

NEURO:  Awake, alert, oriented x4.  Cranial nerves II through XII grossly 
intact. Moves all extremities.

 

SKIN:  Grossly intact.  There is what appears to be mottling on the patient's 
thighs and trunk, also present on her arms, most notable on her trunk.  The 
rash is not raised and there are no signs of infection.

 

 DIAGNOSTIC STUDIES/LAB DATA:  WBC 14.1, RBC 5.41, hemoglobin 15.7, hematocrit 
46, platelets 221.  INR 1.29.  D-dimer greater than 1050.  Sodium 138, 
potassium 3.8, chloride 104, carbon dioxide 25, BUN 12, creatinine 0.77, lactic 
acid 1.7, glucose 100.  , ESR 25, TSH 2.05.  Urinalysis remarkable for 
blood, leukocyte esterase and bacteria.

 

Chest x-ray reads as no active cardiopulmonary disease.

 

Chest thorax CTA reads as no pulmonary arterial filling defect to suggest 
pulmonary embolism.  Right axillary lymphadenopathy.  Cholelithiasis.

 

ASSESSMENT AND PLAN:  Ms. Renetta Rausch is a 33-year-old female with past 
medical history of depression, who presents to the emergency room today with 
complaints of shortness of breath and a rash and was found to be tachycardic 
and meeting systemic inflammatory response syndrome criteria.  The patient will 
be admitted to observation for:

1.  Systemic inflammatory response syndrome.  The patient is meeting systemic 
inflammatory response syndrome criteria with tachycardia, tachypnea, and 
leukocytosis.  There is no clear source of infection.  The patient does have a 
somewhat dirty urinalysis, though it is not completely convincing of a urinary 
tract infection and she does not have any symptoms indicative of a urinary 
tract infection.  There is no evidence of any other source of infection.  I 
will check a strep and a mono, those are pending at this time, though I have 
low suspicion that those will be positive.  There is no evidence of cellulitis 
or pneumonia and she does not have any upper respiratory symptoms.  Because of 
her urinalysis, I will give her 1 dose of ceftriaxone and I will place her on 
IV fluids.

2.  Tachycardia.  The etiology of her tachycardia is unclear.  She was negative 
for a pulmonary embolism and again there is no clear source of infection.  She 
has been given IV fluids in the emergency room.  I will continue her on IV 
fluids and we will monitor her on telemetry.

3.  Shortness of breath.  I suspect that her shortness of breath is secondary 
to her tachycardia as the shortness of breath is worse with exertion as is her 
tachycardia.  I will give fluids as noted above.

4.  Myalgias and rash.  The patient has complaints of generalized myalgias and 
arthralgias starting suddenly 3 days ago.  She also has livedo reticularis to 
her thighs, arms, and trunk.  Again, the etiology is unclear.  This may 
represent an unknown viral illness and as noted above, strep and mono are 
pending at this time, though this is not presenting as a typical strep or mono.
  She did have an LIANA drawn in the emergency room and that is pending at this 
time.  She is noted to have an elevated ESR and CRP.  I have ordered a creatine 
kinase, which is pending at this point.  I think that this may likely represent 
a rheumatological disorder due to her widespread symptoms.  Again, the 
differentials here are wide and may include a viral source of this polymyositis 
such as coxsackievirus or Jeromy-Torres.  I do not think this represents any 
sort of drug-induced response as she has not taken any new medications 
recently.  If her symptoms are persisting tomorrow, it may be worthwhile to 
contact Rheumatology and Dermatology to give further input.  At this point, we 
will continue to monitor her and give her fluids as noted above.  If any of the 
above-mentioned tests come back positive, I will treat accordingly.

5.  Depression.  Continue Latuda.

6.  FEN.  I have ordered fluids as noted above.  She does not require any 
electrolyte repletion at this time and I have ordered a regular diet.

7.  Code status.  The patient will be a full code.

8.  DVT prophylaxis.  According to the DVT Risk Assessment, the patient scores 
a 1, putting her at low risk.  We will use ambulation as DVT prophylaxis.

 

TIME SPENT:  Approximately 70 minutes were spent on this admission, greater 
than half of that time spent face-to-face with the patient obtaining my history
, performing my physical exam, and reviewing my plan of care.

 

This case has been reviewed with my attending, Dr. Castillo, who is in agreement 
with the plan of care.

 

 ____________________________________ ISABELLE LEOS, NP

 

[id number]/CPS #: [id number]

DINORAH

## 2019-03-22 LAB
ANION GAP SERPL CALC-SCNC: 7 MMOL/L (ref 2–11)
BASOPHILS # BLD AUTO: 0.1 10^3/UL (ref 0–0.2)
BUN SERPL-MCNC: 10 MG/DL (ref 6–24)
BUN/CREAT SERPL: 15.9 (ref 8–20)
CALCIUM SERPL-MCNC: 7.6 MG/DL (ref 8.6–10.3)
CHLORIDE SERPL-SCNC: 105 MMOL/L (ref 101–111)
EOSINOPHIL # BLD AUTO: 0.5 10^3/UL (ref 0–0.6)
GLUCOSE SERPL-MCNC: 116 MG/DL (ref 70–100)
HCO3 SERPL-SCNC: 22 MMOL/L (ref 22–32)
HCT VFR BLD AUTO: 47 % (ref 33–41)
HGB BLD-MCNC: 16.6 G/DL (ref 12–16)
LYMPHOCYTES # BLD AUTO: 1.3 10^3/UL (ref 1–4.8)
MCH RBC QN AUTO: 30 PG (ref 27–31)
MCHC RBC AUTO-ENTMCNC: 35 G/DL (ref 31–36)
MCV RBC AUTO: 84 FL (ref 80–97)
MONOCYTES # BLD AUTO: 0.5 10^3/UL (ref 0–0.8)
NEUTROPHILS # BLD AUTO: 12.4 10^3/UL (ref 1.5–7.7)
NRBC # BLD AUTO: 0 10^3/UL
NRBC BLD QL AUTO: 0.3
PLATELET # BLD AUTO: 233 10^3/UL (ref 150–450)
POTASSIUM SERPL-SCNC: 3.8 MMOL/L (ref 3.5–5)
RBC # BLD AUTO: 5.6 10^6 /UL (ref 3.7–4.87)
RBC UR QL AUTO: (no result)
SODIUM SERPL-SCNC: 134 MMOL/L (ref 135–145)
WBC # BLD AUTO: 14.8 10^3/UL (ref 3.5–10.8)
WBC UR QL AUTO: (no result)

## 2019-03-22 RX ADMIN — MICONAZOLE NITRATE SCH APPLIC: 20 CREAM TOPICAL at 20:32

## 2019-03-22 RX ADMIN — NYSTATIN SCH APPLIC: 100000 POWDER TOPICAL at 20:31

## 2019-03-22 RX ADMIN — METHYLPREDNISOLONE SODIUM SUCCINATE SCH MG: 40 INJECTION, POWDER, FOR SOLUTION INTRAMUSCULAR; INTRAVENOUS at 20:30

## 2019-03-22 RX ADMIN — TRAMADOL HYDROCHLORIDE PRN MG: 50 TABLET, FILM COATED ORAL at 17:46

## 2019-03-22 RX ADMIN — TRAMADOL HYDROCHLORIDE PRN MG: 50 TABLET, FILM COATED ORAL at 12:02

## 2019-03-22 RX ADMIN — SODIUM CHLORIDE SCH MLS/HR: 900 IRRIGANT IRRIGATION at 04:06

## 2019-03-22 RX ADMIN — LURASIDONE HYDROCHLORIDE SCH MG: 60 TABLET, FILM COATED ORAL at 20:30

## 2019-03-22 NOTE — PN
Subjective


Date of Service: 03/22/19


Interval History: 





Ms. Renetta Rausch is not feeling any better today. She reports that the rash 

on her torso is now painful, and "feels like a bruise." This developed 

overnight and is causing her discomfort. Her groin pain is also worse, and she 

has significant pain while urinating. She denies dysuria, but notes that the 

area is painful to any sort of pressure. She continues to have SOB, even when 

just moving around in bed. Feels her face is more swollen. Friends at bedside 

agree. Joint and muscle aches are somewhat improved. 





Has been tachycardic all night, up into the 150s. No other complaints or 

concerns from nursing.





Family History: Unchanged from Admission


Social History: Unchanged from Admission


Past Medical History: Unchanged from Admission





Objective


Active Medications: 





Acetaminophen (Tylenol Tab*)  650 mg PO Q4H PRN FEVER/PAIN


Lurasidone HCl (Latuda)  60 mg PO 2100 SOHAIL


Ondansetron HCl (Zofran Inj*)  4 mg IV Q4H PRN NAUSEA/VOMITING


Trazodone HCl (Desyrel Tab*)  50 mg PO BEDTIME SOHAIL





 Vital Signs - 8 hr











  03/22/19 03/22/19





  03:41 08:23


 


Temperature 98.3 F 97.3 F


 


Pulse Rate 105 93


 


Respiratory 18 20





Rate  


 


Blood Pressure 112/59 113/64





(mmHg)  


 


O2 Sat by Pulse 96 96





Oximetry  











Oxygen Devices in Use Now: None


Appearance: Middle-aged female laying in bed in NAD, though dyspneic with 

minimal exertion


Eyes: No Scleral Icterus


Ears/Nose/Mouth/Throat: Mucous Membranes Moist, - - Mild facial and orbital 

edema


Neck: NL Appearance and Movements; NL JVP, Trachea Midline


Respiratory: Symmetrical Chest Expansion and Respiratory Effort, Clear to 

Auscultation


Cardiovascular: NL Sounds; No Murmurs; No JVD, RRR - Tachycardic


Skin: - - Livedo reticularis encompassing the torso and to a lesser extent the 

thighs and arms


Neurological: Alert and Oriented x 3, NL Sensation, NL Muscle Strength and Tone


Lines/Tubes/Other Access: Clean, Dry and Intact Peripheral IV


Nutrition: Taking PO's


Result Diagrams: 


 03/22/19 06:27





 03/22/19 06:27


Additional Lab and Data: 











Assess/Plan/Problems-Billing





Assessment: 





Ms. Renetta Anderson is a 34 yo F with PMH of bipolar depression who presented 

to the ED with c/o SOB and rash and was found to be tachycardic and meeting 

SIRS criteria.





- Patient Problems


(1) SIRS (systemic inflammatory response syndrome)


Code(s): R65.10 - SIRS OF NON-INFECTIOUS ORIGIN W/O ACUTE ORGAN DYSFUNCTION   

Comment: 


- Met criteria on admission with tachycardia, tachypnea, leukocytosis, but no 

obvious source of infection


- Signs/symptoms include: tachycardia, tachypnea, mottling rash primarily on 

torso, facial and orbital edema, vulvar pain, generalized myalgias and 

arthralgias, left ear pain, right axillary lymphadenopathy, low-grade fever, 

elevated CRP and ESR, elevated d-dimer, leukocytosis


- Suspect symptoms may be autoimmune in nature


- Appreciate Rheumatology consult   





(2) Tachycardia


Code(s): R00.0 - TACHYCARDIA, UNSPECIFIED   Comment: 


- HR on telemetry has been 90-140s


- Asymptomatic except for SOB


- Echo shows EF 50-55%, probable diastolic dysfunction


- Will hold off on medication at this point   





(3) Shortness of breath


Code(s): R06.02 - SHORTNESS OF BREATH   Comment: 


- CTA unremarkable


- Suspect this is secondary to tachycardia   





(4) Livedo reticularis


Code(s): R23.1 - PALLOR   Comment: 


- Developed abruptly 3 days prior to admission; encompassing torso and, to a 

lesser extent, thighs and arms


- Previously not painful, but now painful with palpation today


- Etiology unclear, though this certainly does not represent DIC


- Appreciate consult by Dr. Mcguire; plan for punch biopsy


- Continue to monitor   





(5) Generalized pain


Code(s): R52 - PAIN, UNSPECIFIED   Comment: 


- Myalgias, arthralgias, and vulvar pain


- Unclear etiology but suspect rheumatology disorder


- Start Tramadol    





(6) Bipolar depression


Code(s): F31.9 - BIPOLAR DISORDER, UNSPECIFIED   Comment: 


- Continue Latuda   





(7) DVT prophylaxis


Comment: 


- Heparin SQ   





(8) Full code status


Code(s): Z78.9 - OTHER SPECIFIED HEALTH STATUS   Comment: 


   


Status and Disposition: 





Observation. Anticipate d/c home when medically stable.





Attending: Jenny Bowles

## 2019-03-22 NOTE — CONS
CONSULTATION REPORT:

 

DATE OF CONSULT:  03/22/19

 

CONSULTING PHYSICIAN:  Isabelle Leos NP.

 

REASON FOR CONSULT:  Evaluate for autoimmune condition in the setting of a rash 
and shortness of breath.

 

HISTORY OF PRESENT ILLNESS:  Ms. Renetta Rausch is a 33-year-old woman with a 
past medical history of a superficial blood clot in the right lower extremity, 
but otherwise in good health.  She came to the emergency room with complaints 
of worsening shortness of breath as well as a lower extremity rash which has 
since progressed.  Her findings were concerning for her progressive shortness 
of breath; however, a CT scan of her lungs was negative for a pulmonary 
embolism.  However, her rash has progressed and she has also noted some diffuse 
puffiness of her face as well as some edema of the eyelids as well as some 
generalized swelling, myalgias, and arthralgias.  She can no longer get around 
easily in terms of even going to the restroom back and forth primarily because 
of the muscle and joint discomfort, but also because of her shortness of 
breath.  She has also noted some mild discomfort around her rash, which has 
also spread.  She also has complaints of mild ulcer in the back of her mouth 
and she also has some irritation of the vaginal region, so that it is very 
painful when she does urinate.  There has been some possible urinary retention 
as well too.  Her symptoms began a few days ago.  She has had no new 
medications.  She does have healthy pets.  She is working in a doctor's office 
and she is around sick people, but she does not recall any specific illnesses 
that she may have contracted.  I personally spoke to Dr. Castle who is her 
physician and employer, who saw her on Wednesday and checked an antinuclear 
antibody, which Dr. Castle states is still pending as well as complements.  
There was a concern for a possible post streptococcal type of condition, 
although she did not have any recent strep infection and an ASO titer is 
pending as well.  However, Dr. Castle did relate that her white count was 
normal whereas here in the hospital, it is slightly elevated at 14,000.  She 
did have a mild polycythemia, which was also noted during her hospital stay.  
She did have a mild polycythemia on Wednesday and this has persisted during her 
hospital stay.  She has never had an event like this and never had a rash like 
this and no personal history of autoimmune conditions.  She has been exposed to 
no new medication either over-the- counter or otherwise and as noted above not 
been exposed to any sick exposures. She notes that her symptoms began abruptly 
about 3 to 4 days prior to admission and was associated with shortness of breath
, relieved with rest.  As noted above, she also notes that her facial region is 
felt a bit swollen too.  She has also had occasional pain in the left ear, 
which has been intermittent.  She does not have any recent changes in her diet, 
soap, or detergent, but she does have complaints of pain around her labial 
region.  In the ER, she was found to be tachycardic in the 140s.  She was also 
found to be leukocytotic and had an elevated D-dimer with an elevated C-
reactive protein.  Chest x-ray and CT angio were negative for a pulmonary 
embolism, but it did show some right axillary lymphadenopathy.  Of note, the 
rash that she has is more diffuse, but it spares her palms and feet.  She did 
in the ER get some fluids and she was admitted given her persistent symptoms.

 

PAST MEDICAL HISTORY:  Includes:

1.  Bipolar depression.

2.  History of a superficial blood clot in the right lower extremity.

 

PAST SURGICAL HISTORY:  Includes none.

 

HOME MEDICATIONS:  Include:

1.  Latuda 60 mg daily.

2.  Trazodone 50 mg at bedtime.

3.  Multivitamins.

 

FAMILY HISTORY:  Notable for father with atrial fibrillation with some cardiac 
issues related to the position of her heart.  She had a grandmother with 
polymyalgia rheumatica.  Her grandfather had heart disease with coronary artery 
bypass, but ultimately had colon cancer.  Her mother is alive and well.  She 
has a strong family history of diabetes in her grandparents.

 

SOCIAL HISTORY:  She does have a history of smoking about 4 to 5 cigarettes per 
day, which she will be planning on stopping.  She does consume alcohol 
occasionally.  She denies any recreational drug use.  She did try some NyQuil 
the other night, but this is not new.  She does occasionally take NyQuil and 
this is occasionally.  She works as a  at a doctor's office.  She 
lives at home with her fiance and 2 children.  Her mother is also her surrogate 
decision maker.

 

REVIEW OF SYSTEMS:  General:  She has had fatigue.  HEENT:  She has had no 
decrease in her vision.  No red or painful eyes.  On oral, she has had a small 
mouth ulcer. :  She has had no macroscopic blood in the urine, although is 
noted on urinalysis, and she has had some labial discomfort and possibly 
urinary hesitance. She has had low back pain as well.  Musculoskeletal:  Noted 
both for diffuse myalgias as noted above as well as arthralgias.  Skin is 
notable for the rash as noted above.  Neurologic:  No generalized or focal 
weakness.  Endocrine:  No glandular swelling.  Hematologic:  She does have mild 
polycythemia.  Other 14-point review of systems were reviewed and were 
otherwise negative.

 

PHYSICAL EXAM:  She is pleasant, sitting up, in no acute distress, appeared to 
be her stated age.  Her initial temperature was 98.4; heart rate was elevated 
slightly, 100 to 105; respiratory rate of 18; O2 sats 99% initially on room air
; and blood pressure was 112/79 initially.  HEENT Exam:  Normocephalic, 
atraumatic. Pupils equal, round and reactive to light and accommodate.  
Extraocular movements were intact.  Hearing was intact and she did have moist 
oral mucosal membranes with no evidence of mucositis.  I could not visualize an 
ulcer on feeling her oropharynx.  Tonsils revealed no erythema.  Neck revealed 
full range of motion. Thyroid:  No thyromegaly.  Trachea was midline.  Lymph:  
No adenopathy.  Lungs were clear to auscultation bilaterally.  No chest wall 
deformities.  No wheezes or rubs. Cardiovascular exam revealed slightly 
tachycardic rate, but normal rhythm.  S1 and S2 were present.  No murmurs, rubs
, or gallops.  No JVP elevation.  Abdomen:  Soft, obese, nontender, 
nondistended.  Bowel sounds normoactive throughout.  Extremities: Skin was warm 
and smooth.  She did have mild heliotrope edema around her eyelids. No clubbing 
or cyanosis.  Pedal pulses were 2+.  On skin exam, she did have a livedoid 
vascular exanthem which was flat for the most part, which was scattered in 
places, but largely confluent on her trunk and upper extremities, sparing the 
bottom of her feet around her palms and plantar regions of her feet, but she 
did have erythematous exanthem spreading down her legs as well too and her 
abdomen and upper chest wall region.  There appeared to be mild livedo 
reticularis in the lower extremities.  Neurologic:  Otherwise, nonfocal.  
Musculoskeletal:  She had no synovitis.  She had full range of motion of her 
joints.

 

DIAGNOSTIC STUDIES/LAB DATA:  She had a white count of 14.1, RBC count of 5.41, 
hematocrit 46,000, platelets 221,000.  INR of 1.29.  D-dimer greater than 1050. 
Sodium 138, potassium 3.8, chloride of 104.  Urinalysis remarkable for blood, 
leukocyte esterase, and bacteria.  Chest x-ray revealed no active 
cardiopulmonary disease.  Chest CT revealed right axillary lymphadenopathy, but 
no pulmonary adenopathy.  She did have cholelithiasis.

 

ASSESSMENT AND PLAN:  Ms. Renetta Rausch is a 33-year-old woman who has a 
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 
her skin diffusely with some diffuse edema, myalgias, and elevated inflammatory 
markers with leukocytosis.  She is also slightly polycythemic.  I am wondering 
that there is an underlying sleep disorder process such as sleep apnea, but the 
main concern is her more immediate inflammatory condition affecting her skin as 
well as her shortness of breath.  The etiology is unclear, but it does appear 
to be either an allergic reaction to an unknown event or trigger or possibly an 
evolving connective tissue order or vasculopathy.  I understand that she is 
getting a skin biopsy and I would like to follow up this.  We will also check 
an LIANA.  I would also consider checking an RPR, CMV titers, and anticardiolipin 
antibodies as well as an ANCA.  We will also check a celiac panel, although her 
symptoms are very acute for this for anything other an acute allergic reaction, 
also consider a connective tissue disorder.  She does have an elevated D-dimer.
  Consider, if the infection has been ruled out, possible short trial of 
corticosteroids including Solu-Medrol 60 mg daily.  I will continue to follow 
daily.

 

 [id number]/CPS #: [id number]

[facility] CenterGRUPO

## 2019-03-22 NOTE — ECHO
Patient:      FERNANDO ABBOTT   

[facility] Rec#:     [id number]            :          1985          

Date:         2019            Age:          33y                 

Account#:     [id number]          Height:       183 cm / 72.0 in

Accession#:   [id number]           Weight:       142 kg / 313.0 lbs

Sex:          F                     BSA:          2.58

Room#:        401                   

Admit Date#:  2019          

Type:         Inpatient

 

Referring:    Isabelle Leos

Reading:      Cameron Dawkins MD

Sonographer:  Florina Rivera RDCS

CC:           Elijah Catsle

______________________________________________________________________

 

Transthoracic Echocardiogram

 

Indication:

Shortness of breath

BP:           112/59

HR:           104

Rhythm:       NSR

 

Findings     

History:

Morbid obesity, superficial blood clot RLE, family history of

arrhythmias. 

 

Technical Comments:

The study is technically limited due to patient body habitus.  Completed

at 1145. 

 

Left Ventricle:

The left ventricular chamber size is normal. Mild concentric left

ventricular hypertrophy is observed. Global left ventricular wall motion

and contractility are within normal limits. Left ventricular systolic

function is at the lower limits of normal.  The estimated ejection

fraction is 50-55%.  There is an E to A reversal in the mitral valve

flow pattern suggestive of diastolic dysfunction. 

 

Left Atrium:

The left atrial chamber size is normal. 

 

Right Ventricle:

The right ventricular cavity size is normal. The right ventricular

global systolic function is normal.on limited A4C imaging.  

 

Right Atrium:

The right atrium is mildly dilated.  

 

Aortic Valve:

The aortic valve structure is not well visualized. There is no evidence

of aortic regurgitation. There is no evidence of aortic stenosis. 

 

Mitral Valve:

The mitral valve leaflets do not appear thickened. There is a trace of

mitral regurgitation. 

 

Tricuspid Valve:

The tricuspid valve leaflets are normal.  There is a physiologic

tricuspid regurgitation.  Unable to estimate the right ventricular

systolic pressure.   There is no tricuspid stenosis. 

 

Pulmonic Valve:

The pulmonic valve structure is not well visualized. There is a trace

pulmonic regurgitation.  There is no pulmonic stenosis.  

 

Pericardium:

There is no significant pericardial effusion. A pericardial fat pad is

visualized. 

 

Aorta:

There is no dilatation of the ascending aorta. There is no dilatation of

the aortic arch. There is mild dilatation of the aortic root. 

 

Pulmonary Artery:

The main pulmonary artery is not well visualized. 

 

Venous:

The inferior vena cava appears normal in size. There is a greater than

50% respiratory change in the inferior vena cava dimension. 

 

Contrast:

Definity was used to optimize study.  4 mL of diluted Definity were

utilized. Intravenous contrast was used to enhance endocardial border

definition. 

 

Conclusions

Left ventricular systolic function is at the lower limits of normal. 

The estimated ejection fraction is 50-55%. 

Global left ventricular wall motion and contractility are within normal

limits.

The left ventricular chamber size is normal.

Mild concentric left ventricular hypertrophy is observed.

There is an E to A reversal in the mitral valve flow pattern suggestive

of diastolic dysfunction.

The right atrium is mildly dilated. 

Functionally benign heart valves. 

There is mild dilatation of the aortic root.

There is no prior echocardiogram available to compare with at this time.

 

 

Measurements     

Name                    Value         Normal Range            

RVIDd (AP) 2D           2.7 cm        (0.9 - 2.6)             

RVAW (2D)               0.6 cm        (0.2 - 0.5)             

RAd ISD 4CH             5.2 cm        (3.4 - 4.9)             

RA (A4C)W               4.2 cm        (2.9 - 4.6)             

IVSd (2D)               1.1 cm        (0.6 - 1)               

LVPWd (2D)              1.2 cm        (0.6 - 1)               

LVIDd (2D)              3.9 cm        (3.6 - 5.4)             

LVIDs (2D)              3.2 cm        -                        

LV FS (2D)              19 %          (25 - 45)               

Aortic Annulus          2.4 cm        (1.4 - 2.6)             

Ao root diameter (2D)   3.6 cm        (2.1 - 3.5)             

Ascending Ao            3.4 cm        (2.1 - 3.4)             

Aortic arch             2.6 cm        (1.8 - 3.4)             

LA dimension (AP) 2D    3.3 cm        (2.3 - 3.8)             

LAd ISD 4CH             5.4 cm        (2.9 - 5.3)             

LA ISD 4CH W            4 cm          (2.5 - 4.5)             

 

Name                    Value         Normal Range            

LA ESV BP (A/L) index   21 ml/m2      -                        

 

Name                    Value         Normal Range            

MV E-wave Vmax          0.4 m/sec     -                        

MV deceleration time    246 msec      -                        

MV A-wave Vmax          0.6 m/sec     -                        

MV E:A ratio            0.7 ratio     -                        

LV septal e' Vmax       0.07 m/sec    -                        

LV lateral e' Vmax      0.12 m/sec    -                        

LV E:e' septal ratio    5.7 ratio     -                        

LV E:e' lateral ratio   3.3 ratio     -                        

 

Name                    Value         Normal Range            

AV Vmax                 0.9 m/sec     -                        

AV VTI                  14 cm         -                        

AV peak gradient        3 mmHg        -                        

AV mean gradient        2 mmHg        -                        

LVOT Vmax               0.7 m/sec     -                        

LVOT VTI                10 cm         -                        

LVOT peak gradient      2 mmHg        -                        

LVOT mean gradient      1 mmHg        -                        

JONATHAN Vmax                0.9 m/sec     -                        

 

Name                    Value         Normal Range            

IVC diameter            1.9 cm        -                        

 

Name                    Value         Normal Range            

PV Vmax                 0.7 m/sec     -                        

PV peak gradient        2 mmHg        -                        

 

Electronically signed by: Cameron Dawkins MD on 2019 12:44:44

## 2019-03-22 NOTE — CONSULT
Consult


Consult: 





Ms. Viviana Rausch is a 33 year old woman with an acute livedoid 

vasculopathy on exam, edema, myalgias and arthralgias, with an elevated white 

count and inflammatory marker.





Her presentation favors an acute allergic reaction to an unknown agent or 

reactive process.  Consider an evolving connective tissue disorder.





Skin biopsy and autoimmune serologies are pending.





Consider trial of IV Solumedrol 60mg to treat her acute inflammatory condition.

## 2019-03-23 VITALS — DIASTOLIC BLOOD PRESSURE: 76 MMHG | SYSTOLIC BLOOD PRESSURE: 115 MMHG

## 2019-03-23 LAB
ANION GAP SERPL CALC-SCNC: 6 MMOL/L (ref 2–11)
BASOPHILS # BLD AUTO: 0 10^3/UL (ref 0–0.2)
BUN SERPL-MCNC: 11 MG/DL (ref 6–24)
BUN/CREAT SERPL: 22.4 (ref 8–20)
CALCIUM SERPL-MCNC: 7.8 MG/DL (ref 8.6–10.3)
CHLORIDE SERPL-SCNC: 101 MMOL/L (ref 101–111)
EOSINOPHIL # BLD AUTO: 0 10^3/UL (ref 0–0.6)
GLUCOSE SERPL-MCNC: 182 MG/DL (ref 70–100)
HCO3 SERPL-SCNC: 24 MMOL/L (ref 22–32)
HCT VFR BLD AUTO: 46 % (ref 33–41)
HGB BLD-MCNC: 15.9 G/DL (ref 12–16)
LYMPHOCYTES # BLD AUTO: 1.1 10^3/UL (ref 1–4.8)
MCH RBC QN AUTO: 29 PG (ref 27–31)
MCHC RBC AUTO-ENTMCNC: 34 G/DL (ref 31–36)
MCV RBC AUTO: 84 FL (ref 80–97)
MONOCYTES # BLD AUTO: 0.2 10^3/UL (ref 0–0.8)
NEUTROPHILS # BLD AUTO: 8.7 10^3/UL (ref 1.5–7.7)
NRBC # BLD AUTO: 0 10^3/UL
NRBC BLD QL AUTO: 0
PLATELET # BLD AUTO: 247 10^3/UL (ref 150–450)
POTASSIUM SERPL-SCNC: 4.3 MMOL/L (ref 3.5–5)
RBC # BLD AUTO: 5.47 10^6 /UL (ref 3.7–4.87)
SODIUM SERPL-SCNC: 131 MMOL/L (ref 135–145)
WBC # BLD AUTO: 10.1 10^3/UL (ref 3.5–10.8)

## 2019-03-23 RX ADMIN — NYSTATIN SCH APPLIC: 100000 POWDER TOPICAL at 07:45

## 2019-03-23 RX ADMIN — MICONAZOLE NITRATE SCH APPLIC: 20 CREAM TOPICAL at 07:45

## 2019-03-23 RX ADMIN — METHYLPREDNISOLONE SODIUM SUCCINATE SCH MG: 40 INJECTION, POWDER, FOR SOLUTION INTRAMUSCULAR; INTRAVENOUS at 07:45

## 2019-03-23 NOTE — DS
CC:  Dr. Castle *

 

DISCHARGE SUMMARY:

 

DATE OF ADMISSION:  03/21/19

 

DATE OF DISCHARGE:  03/23/19

 

PRIMARY CARE PHYSICIAN:  Dr. Castle.

 

ATTENDING PHYSICIAN:  Dr. Bowles * (dictation provided by Tiffany Mccrary NP).

 

PRIMARY DIAGNOSES:  Diffuse rash with shortness of breath, arthralgias, myalgias
, vaginitis, and lymphadenopathy, now resolving, of unclear etiology.

 

SECONDARY DIAGNOSES:

1.  History of bipolar depression.

2.  History of superficial blood clots in the right lower extremity.

 

MEDICATIONS AT TIME OF DISCHARGE:

1.  Prednisone starting at 60 mg p.o. daily, decreasing by 10 mg q. 4 days.

2.  Diflucan 150 mg p.o. once for persistent vaginitis p.r.n.

3.  Latuda 60 mg p.o. daily.

4.  Trazodone 50 mg p.o. at bedtime.

5.  Multivitamin 1 tab p.o. daily.

 

HOSPITAL COURSE:  Ms. Renetta Rausch is a 33-year-old female who presented to 
the hospital on 03/21/19 with concern for shortness of breath and rash.  Please 
see dictated H and P from Isabelle Leos for complete details.  In brief, the 
patient states that her symptoms had come on abruptly about 3 days prior to 
admission.  She noted shortness of breath with exertion.  She also noted a 
diffuse generalized rash.  She also complained of generalized myalgias and 
arthralgias and some facial swelling.  In her primary care office, she was 
noted to have heart rate in the 140s and therefore, she was sent to the 
emergency room for evaluation.  In the emergency room, the patient also noted 
pain with urination, described as pain with urine hitting the vulva, consistent 
with a vaginitis.

 

In the emergency room, the patient had an extensive workup began including:
Chest x-ray, which showed "no active cardiopulmonary disease." Chest thorax CTA 
that showed "no pulmonary arterial  filling defect to suggest pulmonary 
embolism.  Right axillary lymphadenopathy and cholelithiasis." She had an EKG, 
which showed a sinus tachycardia with a heart rate of 103 and no evidence of 
ischemia. Labs showed a white blood cell count of 14.1 and normal hemoglobin 
and platelet count.  Her ESR is 25.  Her CRP was 132.76.  Her BUN and 
creatinine were normal. Her electrolytes were normal.  Beta hCG was negative.  
Her urinalysis showed 2+ leuk esterase, 2+ bacteria, squamous epithelial cells 
were present. She was tachycardiac with a heart rate of 128.  Blood pressure 
was stable systolically in the 110s.  She was not hypoxic on room air.

 

Further workup included transthoracic echocardiogram that showed an intact 
ejection fraction 50% to 55% with no significant wall motion or valvular 
abnormalities. Because of the rash, the patient did have a biopsy and that 
result is pending. Based on her symptoms of unclear etiology with no evidence 
of infection, she was seen in consultation by Dr. Romeo from Rheumatology and 
I refer you to his note for complete details but in brief, he noted that her 
defuse rash appeared could be related to vasculopathy or vasculitis and he 
wondered if possibly she had an inflammatory condition caused by an allergic 
reaction to an unknown event or trigger or possibly an involving connective 
tissue disorder or vasculopathy.  His workup included an LIANA, RPR, CMV titers, 
anticardiolipin antibodies, ANCA, celiac panel, Jeromy-Barr virus, C4 
complement, cryoglobulin and cryofibrinogen, and parvovirus.  Thus far results 
have been available are that syphilis antibody was negative, mono screen was 
negative, flu swabs were negative.  For her vaginitis symptoms, she had a 
negative gonorrhea and chlamydia as well as negative yeast and BV.  Antinuclear 
antibody was 0.2.

 

Dr. Romeo did also recommend the initiation of Solu-Medrol, which was done as 
of yesterday.  The patient's report stated that she is feeling better.  She has 
had significant improvement in her rash.  She has been able to be up and 
ambulating on the unit without significant shortness of breath or tachycardia.  
She has had some significant improvement in her vaginitis as well and will note 
that she was treated with 1 round of Diflucan for suspected vulvovaginitis.

 

Ms. Jackson is doing much better today.  The etiology of her symptoms remains 
unclear; however, recommendations are from Dr. Romeo that she can follow up 
with him within 1 week while she continues a prolonged steroid taper, again as 
noted above starting at 60 mg and deceasing by 10 mg q.4 days.

 

Ms. Renetta Rausch is medically stable for discharge to home for close followup 
with her primary care physician and her rheumatologist.

 

DISPOSITION:  Home.

 

DIET:  Regular.

 

ACTIVITY:  As tolerated.

 

FOLLOWUP PLANS:

1.  Please follow up with Dr. Romeo in the next week.

2.  Please follow up with Dr. Castle within the next week.

 

TIME SPENT:  Approximately 60 minutes was spent on the discharge of this 
patient with more than half the time spent with the patient at bedside 
reviewing the events leading up to and during this hospitalization, performing 
the physical examination and reviewing the plan of care.

 

____________________________________ 

TIFFANY MCCRARY NP

 

[id number]/CPS #: [id number]

DINORAH

## 2019-03-23 NOTE — PN
Subjective





- Subjective


Date of Service: 03/23/19 - Chief Complaint: Rash, Dyspnea


History: 





Ms. Renetta Rausch began Solumedrol this morning and already is feeling much 

better.





Her rash is resolving.  She denied dyspnea and denied significant joint 

complaints. No significant  symptoms.





Able to ambulate.





Swelling around eyes is better


Active Problems: 


 Active Problems





Bipolar depression (Acute) F31.9


  - Continue Latuda 


DVT prophylaxis (Acute) QLR8721


  - Heparin SQ 


Full code status (Acute) Z78.9


   


Generalized pain (Acute) R52


  - Myalgias, arthralgias, and vulvar pain - Unclear etiology but suspect 

rheumatology disorder - Start Tramadol 


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


  - Developed abruptly 3 days prior to admission; encompassing torso and, to a 

lesser extent, thighs and arms - Previously not painful, but now painful with 

palpation today - Etiology unclear, though this certainly does not represent 

DIC - Appreciate consult by Dr. Mcguire; punch biopsy results pending - 

Continue to monitor 


SIRS (systemic inflammatory response syndrome) (Acute) R65.10


  - Resolved, met criteria on admission with tachycardia, tachypnea, 

leukocytosis, but no obvious source of infection - Patient reported dysuria.  

UA overtly positive, urine culture with mixed gilson and likely contamination, 

repeat culture pending. GC/chlamydia negative. - Signs/symptoms have included: 

tachycardia, tachypnea, mottling rash primarily on torso, facial and orbital 

edema, vulvar pain, generalized myalgias and arthralgias, left ear pain, right 

axillary lymphadenopathy, low-grade fever, elevated CRP and ESR, elevated d-

dimer, leukocytosis, dysuria - Suspect symptoms may be autoimmune in nature, 

solumedrol started, multiple rheum studies pending 


Shortness of breath (Acute) R06.02


  - CTA unremarkable - Suspect this is secondary to tachycardia 


Tachycardia (Acute) R00.0


  - HR on telemetry has been 70-100s this AM - Asymptomatic except for SOB - 

Echo shows EF 50-55%, probable diastolic dysfunction 








Current Medications: 


 Current Medications





Acetaminophen (Tylenol Tab*)  650 mg PO Q4H PRN


   PRN Reason: FEVER/PAIN


   Last Admin: 03/21/19 19:44 Dose:  650 mg


Lurasidone HCl (Latuda)  60 mg PO 2100 SOHAIL


   Last Admin: 03/22/19 20:30 Dose:  60 mg


Methylprednisolone Sodium Succinate (Solu-Medrol 125mg *)  60 mg IV DAILY [facility]


Ondansetron HCl (Zofran Inj*)  4 mg IV Q4H PRN


   PRN Reason: NAUSEA/VOMITING


Tramadol HCl (Ultram*)  50 mg PO Q6H PRN


   PRN Reason: PAIN


   Last Admin: 03/22/19 17:46 Dose:  50 mg


Trazodone HCl (Desyrel Tab*)  100 mg PO BEDTIME [facility]


   Last Admin: 03/22/19 21:55 Dose:  100 mg











- Review of Systems


Constitutional Symptoms: No: Weight Gain, Weakness, Unexplained Falls


Dermatology: Rash: Yes, Change in Skin Lesion: Yes - Improving


HEENT: No Normal


Eyes: Positive: Normal


Thyroid: Positive: Normal


Pulmonary: Positive: Normal


Cardiology: Positive: Normal


Gastroenterology: Positive: Normal


Endocrinology: Positive: Normal


Neurology: Positive: Normal


Psychiatry: Positive: Normal


Allergic/Immunologic: Positive: Immunocompromise


Home Medications: 


 Home Medications











 Medication  Instructions  Recorded  Confirmed  Type


 


Lurasidone(*) [Latuda] 60 mg PO DAILY 03/21/19 03/21/19 History


 


traZODone TAB* [Desyrel TAB*] 50 mg PO BEDTIME 03/21/19 03/21/19 History











Allergies: 


 Allergies











Allergy/AdvReac Type Severity Reaction Status Date / Time


 


fluoxetine Allergy  Unknown Verified 03/21/19 09:25





   Reaction  





   Details  


 


Penicillins Allergy  Hives Verified 03/21/19 09:25














Objective





- Vital Signs


Vital Signs: 


 Vital Signs











  03/22/19 03/22/19 03/22/19





  15:17 16:06 17:46


 


Temperature 97.4 F  


 


Pulse Rate 119  


 


Respiratory 20 18 16





Rate   


 


Blood Pressure 124/92  





(mmHg)   


 


O2 Sat by Pulse 97  





Oximetry   














  03/22/19 03/22/19 03/22/19





  20:00 20:31 23:17


 


Temperature   97.5 F


 


Pulse Rate   97


 


Respiratory 20 16 20





Rate   


 


Blood Pressure   106/62





(mmHg)   


 


O2 Sat by Pulse   95





Oximetry   














  03/23/19 03/23/19 03/23/19





  02:59 07:25 11:08


 


Temperature 97.7 F 97.6 F 96.4 F


 


Pulse Rate 77 109 82


 


Respiratory 20 18 20





Rate   


 


Blood Pressure 104/66 124/82 115/76





(mmHg)   


 


O2 Sat by Pulse 95 96 96





Oximetry   














  03/23/19





  12:50


 


Temperature 96.4 F


 


Pulse Rate 82


 


Respiratory 20





Rate 


 


Blood Pressure 115/76





(mmHg) 


 


O2 Sat by Pulse 96





Oximetry 














- Intake and Output


Intake and Output: 


 Intake & Output











 03/21/19 03/22/19 03/23/19 03/24/19





 06:59 06:59 06:59 06:59


 


Intake Total  1700 1780 480


 


Balance  1700 1780 480


 


Weight  312 lb 11.2 oz  


 


Intake:    


 


  IV Fluids  1700 500 


 


    normal saline   500 


 


  Oral  0 1280 480


 


Other:    


 


  Estimated Void  Medium Medium 


 


  Date of Last Bowel   [id number] 





  Movement    


 


  # Bowel Movements   2 


 


  Estimated Stool Amount   Medium 


 


  # Voids  1 2 





 





ADLs: Meal  Record                                         Start:  03/21/19 20:

00


Freq:   DAILY@0900,1400,1800                               Status: Active      

  


Protocol:                                                                      

  


 Created      03/21/19 20:00  System  (Rec: 03/21/19 20:00  System  TELE-C15)


 Document     03/22/19 09:00  [id number]  (Rec: 03/22/19 10:01  [id number]  MED-C09)


 Document     03/22/19 13:22  [id number]  (Rec: 03/22/19 13:22  [id number]  MED-C13)


 Document     03/22/19 18:00  [id number]  (Rec: 03/22/19 19:33  [id number]  MED-C14)


 Document     03/23/19 09:00  [id number]  (Rec: 03/23/19 09:05  [id number]  MED-C11)


 Document     03/23/19 13:01  [id number]  (Rec: 03/23/19 13:02  [id number]  MED-C07)


Intake and Output                                          Start:  03/21/19 09:

01


Freq:                                                      Status: Inactive    

  


Protocol:                                                                      

  


 Created      03/21/19 09:01  System  (Rec: 03/21/19 09:01  System  ED-C24)


Intake and Output                                          Start:  03/21/19 20:

00


Freq:   DAILY@0600,1400,2200                               Status: Active      

  


Protocol:                                                                      

  


 Created      03/21/19 20:00  System  (Rec: 03/21/19 20:00  System  TELE-C15)


 Document     03/21/19 22:00  [id number]  (Rec: 03/22/19 00:25  [id number]  MED-C13)


 Document     03/22/19 05:59  [id number]  (Rec: 03/22/19 05:59  [id number]  MED-C13)


 Document     03/22/19 14:00  [id number]  (Rec: 03/22/19 14:22  [id number]  MED-C13)


 Document     03/22/19 21:28  [id number]  (Rec: 03/22/19 21:30  [id number]  MED-C14)


 Document     03/23/19 05:27  [id number]  (Rec: 03/23/19 05:29  [id number]  MED-C11)











- Physical Exam


General Physical Exam Comment: No acute distress; sitting up


Eye Exam: bilateral: EOMI


Skin: Abnormal: Rash - Much improved; very faint on the legs; no periorbital 

edema


Thyroid Function: Clinically Euthyroid


Endocrine: Yes Central Obesity


Lungs and Chest: Yes: Chest Expansion Full, Chest Expansion Symetrica, 

Percussion Note Resonant


Heart Rate and Rhythm: Regular


JVP: Not Elevated


Rushford Beat: Non Displaced


Additional Cardiovascular: Yes: Rushford Beat not Displaced, Normal Heart Sounds


Abdominal Exam: Yes: Soft





- Neuro


Psychiatric: Normal


Speech: Normal





Results





- Results


Lab Results: 


 Laboratory Results - last 24 hr











  03/21/19 03/22/19 03/22/19





  10:31 17:27 17:27


 


WBC   


 


RBC   


 


Hgb   


 


Hct   


 


MCV   


 


MCH   


 


MCHC   


 


RDW   


 


Plt Count   


 


MPV   


 


Neut % (Auto)   


 


Lymph % (Auto)   


 


Mono % (Auto)   


 


Eos % (Auto)   


 


Baso % (Auto)   


 


Absolute Neuts (auto)   


 


Absolute Lymphs (auto)   


 


Absolute Monos (auto)   


 


Absolute Eos (auto)   


 


Absolute Basos (auto)   


 


Absolute Nucleated RBC   


 


Nucleated RBC %   


 


Fibrinogen    718.3 H


 


Sodium   


 


Potassium   


 


Chloride   


 


Carbon Dioxide   


 


Anion Gap   


 


BUN   


 


Creatinine   


 


Est GFR ( Amer)   


 


Est GFR (Non-Af Amer)   


 


BUN/Creatinine Ratio   


 


Glucose   


 


Calcium   


 


Anti-Nuclear Antibody  0.2  


 


Syphilis IgG Antibody   Nonreactive 














  03/23/19 03/23/19





  04:43 04:43


 


WBC  10.1 


 


RBC  5.47 H 


 


Hgb  15.9 


 


Hct  46 H 


 


MCV  84 


 


MCH  29 


 


MCHC  34 


 


RDW  13 


 


Plt Count  247 


 


MPV  8.8 


 


Neut % (Auto)  86.5 


 


Lymph % (Auto)  10.5 


 


Mono % (Auto)  2.4 


 


Eos % (Auto)  0.3 


 


Baso % (Auto)  0.3 


 


Absolute Neuts (auto)  8.7 H 


 


Absolute Lymphs (auto)  1.1 


 


Absolute Monos (auto)  0.2 


 


Absolute Eos (auto)  0 


 


Absolute Basos (auto)  0 


 


Absolute Nucleated RBC  0 


 


Nucleated RBC %  0 


 


Fibrinogen  


 


Sodium   131 L


 


Potassium   4.3


 


Chloride   101


 


Carbon Dioxide   24


 


Anion Gap   6


 


BUN   11


 


Creatinine   0.49 L


 


Est GFR ( Amer)   176.0


 


Est GFR (Non-Af Amer)   145.4


 


BUN/Creatinine Ratio   22.4 H


 


Glucose   182 H


 


Calcium   7.8 L


 


Anti-Nuclear Antibody  


 


Syphilis IgG Antibody  














Assessment





- Problem List


Assessment: 


Patient Problems





Bipolar depression (Acute)


DVT prophylaxis (Acute)


Full code status (Acute)


Generalized pain (Acute)


Livedo reticularis (Acute)


SIRS (systemic inflammatory response syndrome) (Acute)


Shortness of breath (Acute)


Tachycardia (Acute)








Plan: 





Rash: much improved on Steroids.





Consider conversion to 60mg prednisone tomorrow with a gradual taper of 10mg 

every 4 days.





I would like to follow up with her in 1 week to review labs and skin biopsy.





Arthralgias: Improved: possibly an allergic reaction to an unknown etiology.


Does not seem to have significant mucous membrane involvement.





LIANA was negative.





Consider IGA deposition?





Dyspnea: Improved.





Vaginal irritation: will likely need PCP follow up soon to address; it may 

improve as we reduce inflammation

## 2019-03-23 NOTE — PN
Subjective


Date of Service: 03/23/19


Interval History: 





Ms. Renetta Rausch reports feeling much better today.  Her rash is resolving.  

Her vulvar pain is resolving. She has ambulated around the unit without 

significant SOB or tachycardia.





Family History: Unchanged from Admission


Social History: Unchanged from Admission


Past Medical History: Unchanged from Admission





Objective


Active Medications: 





Acetaminophen (Tylenol Tab*)  650 mg PO Q4H PRN


Lurasidone HCl (Latuda)  60 mg PO 2100 SOHAIL


Methylprednisolone Sodium Succinate (Solu-Medrol 40 Mg)  60 mg IV Q12H SOHAIL


Miconazole Nitrate (Monistat 2%*)  1 applic TOPICAL BID SOHAIL


Nystatin (Nystatin Top Powder*)  1 applic TOPICAL TID SOHAIL


Ondansetron HCl (Zofran Inj*)  4 mg IV Q4H PRN


Tramadol HCl (Ultram*)  50 mg PO Q6H PRN


Trazodone HCl (Desyrel Tab*)  100 mg PO BEDTIME SOHAIL





Vital Signs:











Temp Pulse Resp BP Pulse Ox


 


 97.6 F   109   18   124/82   96 


 


 03/23/19 07:25  03/23/19 07:25  03/23/19 07:25  03/23/19 07:25  03/23/19 07:25











Oxygen Devices in Use Now: None


Appearance: Female sitting up in bed in NAD


Eyes: No Scleral Icterus


Ears/Nose/Mouth/Throat: Mucous Membranes Moist


Neck: Trachea Midline


Respiratory: Symmetrical Chest Expansion and Respiratory Effort, Clear to 

Auscultation


Cardiovascular: NL Sounds; No Murmurs; No JVD, No Edema


Abdominal: NL Sounds; No Tenderness; No Distention


Extremities: No Edema


Skin: - - Rash greatly resolved, significant erythema to vulva, no drainage 

noted


Neurological: Alert and Oriented x 3, NL Muscle Strength and Tone


Nutrition: Taking PO's


Result Diagrams: 


 03/23/19 04:43





 03/23/19 04:43


Additional Lab and Data: 


.





Assess/Plan/Problems-Billing





Assessment: 





Ms. Renetta Anderson is a 32 yo F with PMH of bipolar depression who presented 

to the ED with c/o SOB and rash and was found to be tachycardic and meeting 

SIRS criteria with ? of allergic or autoimmune process.








- Patient Problems


(1) SIRS (systemic inflammatory response syndrome)


Comment: 


- Resolved, met criteria on admission with tachycardia, tachypnea, leukocytosis

, but no obvious source of infection


- Patient reported vulvar pain with urination with clinical symptoms of 

vaginitis. Exam consistent with yeast, but swab negative.


- Signs/symptoms have included: tachycardia, tachypnea, mottling rash primarily 

on torso, facial and orbital edema, vulvar pain, generalized myalgias and 

arthralgias, left ear pain, right axillary lymphadenopathy, low-grade fever, 

elevated CRP and ESR, elevated d-dimer, leukocytosis, dysuria


- Suspect symptoms may be autoimmune in nature, solumedrol started, multiple 

rheum studies pending   





(2) Tachycardia


Comment: 


- HR on telemetry has been 70-100s this AM


- Asymptomatic except for SOB


- Echo shows EF 50-55%, probable diastolic dysfunction   





(3) Shortness of breath


Comment: 


- CTA unremarkable


- Suspect this is secondary to tachycardia   





(4) Livedo reticularis


Comment: 


- Resolving


- Developed abruptly 3 days prior to admission; encompassing torso and, to a 

lesser extent, thighs and arms


- Previously not painful, but now painful with palpation today


- Etiology unclear, though this certainly does not represent DIC


- Appreciate consult by Dr. Mcguire; punch biopsy results pending


- Continue to monitor   





(5) Vaginitis


Comment: 


- Yeast swab was negative, BV negative, GC/Chlamydia negative


- Patient partially treated for yeast, so may have been false negative


- Did give diflucan x 1 and will send patient with another dose to take in 3 

days


- May be related to overall presentation or simply a sequalae.  


- Patient will be following up closely with PCP and Dr. Romeo   





(6) Generalized pain


Comment: 


- Myalgias, arthralgias, and vulvar pain


- Unclear etiology but suspect rheumatology disorder


- Start Tramadol    





(7) Bipolar depression


Comment: 


- Continue Latuda   





(8) DVT prophylaxis


Comment: 


- Heparin SQ   





(9) Full code status


Comment: 


   


Status and Disposition: 





Observation. Anticipate d/c home when medically stable.

## 2019-03-25 LAB — IGA SERPL-MCNC: 319 MG/DL (ref 61–356)

## 2019-03-27 ENCOUNTER — HOSPITAL ENCOUNTER (EMERGENCY)
Dept: HOSPITAL 25 - ED | Age: 34
Discharge: HOME | End: 2019-03-27
Payer: COMMERCIAL

## 2019-03-27 VITALS — DIASTOLIC BLOOD PRESSURE: 64 MMHG | SYSTOLIC BLOOD PRESSURE: 122 MMHG

## 2019-03-27 DIAGNOSIS — Z88.0: ICD-10-CM

## 2019-03-27 DIAGNOSIS — R60.0: Primary | ICD-10-CM

## 2019-03-27 DIAGNOSIS — Z86.718: ICD-10-CM

## 2019-03-27 DIAGNOSIS — Z88.8: ICD-10-CM

## 2019-03-27 DIAGNOSIS — R21: ICD-10-CM

## 2019-03-27 DIAGNOSIS — R53.83: ICD-10-CM

## 2019-03-27 DIAGNOSIS — F17.210: ICD-10-CM

## 2019-03-27 PROCEDURE — 99281 EMR DPT VST MAYX REQ PHY/QHP: CPT

## 2019-03-27 PROCEDURE — 93970 EXTREMITY STUDY: CPT
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
## 2019-04-03 ENCOUNTER — HOSPITAL ENCOUNTER (EMERGENCY)
Dept: HOSPITAL 25 - ED | Age: 34
Discharge: HOME | End: 2019-04-03
Payer: COMMERCIAL

## 2019-04-03 VITALS — DIASTOLIC BLOOD PRESSURE: 84 MMHG | SYSTOLIC BLOOD PRESSURE: 137 MMHG

## 2019-04-03 DIAGNOSIS — R31.29: Primary | ICD-10-CM

## 2019-04-03 DIAGNOSIS — Z88.8: ICD-10-CM

## 2019-04-03 DIAGNOSIS — F17.210: ICD-10-CM

## 2019-04-03 DIAGNOSIS — Z86.718: ICD-10-CM

## 2019-04-03 DIAGNOSIS — Z88.0: ICD-10-CM

## 2019-04-03 PROCEDURE — 99282 EMERGENCY DEPT VISIT SF MDM: CPT

## 2019-04-03 PROCEDURE — 76775 US EXAM ABDO BACK WALL LIM: CPT
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
